# Patient Record
Sex: FEMALE | Race: BLACK OR AFRICAN AMERICAN | Employment: UNEMPLOYED | URBAN - METROPOLITAN AREA
[De-identification: names, ages, dates, MRNs, and addresses within clinical notes are randomized per-mention and may not be internally consistent; named-entity substitution may affect disease eponyms.]

---
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
## 2022-10-07 ENCOUNTER — APPOINTMENT (OUTPATIENT)
Dept: GENERAL RADIOLOGY | Age: 22
DRG: 313 | End: 2022-10-07
Attending: SURGERY
Payer: COMMERCIAL

## 2022-10-07 ENCOUNTER — APPOINTMENT (OUTPATIENT)
Dept: GENERAL RADIOLOGY | Age: 22
DRG: 313 | End: 2022-10-07
Attending: EMERGENCY MEDICINE
Payer: COMMERCIAL

## 2022-10-07 ENCOUNTER — APPOINTMENT (OUTPATIENT)
Dept: GENERAL RADIOLOGY | Age: 22
DRG: 313 | End: 2022-10-07
Attending: ORTHOPAEDIC SURGERY
Payer: COMMERCIAL

## 2022-10-07 ENCOUNTER — ANESTHESIA (OUTPATIENT)
Dept: SURGERY | Age: 22
DRG: 313 | End: 2022-10-07
Payer: COMMERCIAL

## 2022-10-07 ENCOUNTER — APPOINTMENT (OUTPATIENT)
Dept: CT IMAGING | Age: 22
DRG: 313 | End: 2022-10-07
Attending: EMERGENCY MEDICINE
Payer: COMMERCIAL

## 2022-10-07 ENCOUNTER — HOSPITAL ENCOUNTER (INPATIENT)
Age: 22
LOS: 6 days | Discharge: HOME HEALTH | DRG: 313 | End: 2022-10-13
Attending: EMERGENCY MEDICINE | Admitting: SURGERY
Payer: COMMERCIAL

## 2022-10-07 ENCOUNTER — ANESTHESIA EVENT (OUTPATIENT)
Dept: SURGERY | Age: 22
DRG: 313 | End: 2022-10-07
Payer: COMMERCIAL

## 2022-10-07 ENCOUNTER — APPOINTMENT (OUTPATIENT)
Dept: CT IMAGING | Age: 22
DRG: 313 | End: 2022-10-07
Attending: ORTHOPAEDIC SURGERY
Payer: COMMERCIAL

## 2022-10-07 DIAGNOSIS — R41.82 ALTERED MENTAL STATUS, UNSPECIFIED ALTERED MENTAL STATUS TYPE: ICD-10-CM

## 2022-10-07 DIAGNOSIS — S82.201C TYPE III OPEN FRACTURE OF RIGHT TIBIA AND FIBULA, INITIAL ENCOUNTER: Primary | ICD-10-CM

## 2022-10-07 DIAGNOSIS — S82.401C TYPE III OPEN FRACTURE OF RIGHT TIBIA AND FIBULA, INITIAL ENCOUNTER: Primary | ICD-10-CM

## 2022-10-07 PROBLEM — S82.891B OPEN RIGHT ANKLE FRACTURE: Status: ACTIVE | Noted: 2022-10-07

## 2022-10-07 LAB
ALBUMIN SERPL-MCNC: 3.6 G/DL (ref 3.5–5)
ALBUMIN/GLOB SERPL: 1 {RATIO} (ref 1.1–2.2)
ALP SERPL-CCNC: 89 U/L (ref 45–117)
ALT SERPL-CCNC: 32 U/L (ref 12–78)
AMPHET UR QL SCN: NEGATIVE
ANION GAP SERPL CALC-SCNC: 12 MMOL/L (ref 5–15)
ANION GAP SERPL CALC-SCNC: 6 MMOL/L (ref 5–15)
APTT PPP: 24.5 SEC (ref 21.2–34.1)
ARTERIAL PATENCY WRIST A: YES
ARTERIAL PATENCY WRIST A: YES
AST SERPL W P-5'-P-CCNC: 21 U/L (ref 15–37)
BARBITURATES UR QL SCN: NEGATIVE
BASE DEFICIT BLDA-SCNC: 3.8 MMOL/L
BASE DEFICIT BLDA-SCNC: 6 MMOL/L
BASOPHILS # BLD: 0 K/UL (ref 0–0.1)
BASOPHILS NFR BLD: 0 % (ref 0–1)
BDY SITE: ABNORMAL
BDY SITE: ABNORMAL
BENZODIAZ UR QL: NEGATIVE
BILIRUB SERPL-MCNC: 0.2 MG/DL (ref 0.2–1)
BODY TEMPERATURE: 98
BODY TEMPERATURE: 98.2
BUN SERPL-MCNC: 11 MG/DL (ref 6–20)
BUN SERPL-MCNC: 19 MG/DL (ref 6–20)
BUN/CREAT SERPL: 16 (ref 12–20)
BUN/CREAT SERPL: 17 (ref 12–20)
CA-I BLD-MCNC: 7.5 MG/DL (ref 8.5–10.1)
CA-I BLD-MCNC: 8.3 MG/DL (ref 8.5–10.1)
CANNABINOIDS UR QL SCN: POSITIVE
CHLORIDE SERPL-SCNC: 106 MMOL/L (ref 97–108)
CHLORIDE SERPL-SCNC: 110 MMOL/L (ref 97–108)
CO2 SERPL-SCNC: 20 MMOL/L (ref 21–32)
CO2 SERPL-SCNC: 23 MMOL/L (ref 21–32)
COCAINE UR QL SCN: NEGATIVE
COHGB MFR BLD: 0.3 % (ref 1–2)
COHGB MFR BLD: 0.3 % (ref 1–2)
CREAT SERPL-MCNC: 0.64 MG/DL (ref 0.55–1.02)
CREAT SERPL-MCNC: 1.21 MG/DL (ref 0.55–1.02)
DIFFERENTIAL METHOD BLD: ABNORMAL
DRUG SCRN COMMENT,DRGCM: ABNORMAL
EOSINOPHIL # BLD: 0 K/UL (ref 0–0.4)
EOSINOPHIL NFR BLD: 0 % (ref 0–7)
ERYTHROCYTE [DISTWIDTH] IN BLOOD BY AUTOMATED COUNT: 16.8 % (ref 11.5–14.5)
ERYTHROCYTE [DISTWIDTH] IN BLOOD BY AUTOMATED COUNT: 17.2 % (ref 11.5–14.5)
ETHANOL SERPL-MCNC: 146 MG/DL
FIO2 ON VENT: 30 %
FIO2 ON VENT: 50 %
GAS FLOW.O2 SETTING OXYMISER: 16 L/MIN
GAS FLOW.O2 SETTING OXYMISER: 16 L/MIN
GLOBULIN SER CALC-MCNC: 3.6 G/DL (ref 2–4)
GLUCOSE SERPL-MCNC: 163 MG/DL (ref 65–100)
GLUCOSE SERPL-MCNC: 89 MG/DL (ref 65–100)
HCG SERPL QL: NEGATIVE
HCO3 BLDA-SCNC: 20 MMOL/L (ref 22–26)
HCO3 BLDA-SCNC: 20 MMOL/L (ref 22–26)
HCT VFR BLD AUTO: 29.4 % (ref 35–47)
HCT VFR BLD AUTO: 36.9 % (ref 35–47)
HGB BLD-MCNC: 11.3 G/DL (ref 11.5–16)
HGB BLD-MCNC: 9.2 G/DL (ref 11.5–16)
IMM GRANULOCYTES # BLD AUTO: 0 K/UL (ref 0–0.04)
IMM GRANULOCYTES NFR BLD AUTO: 0 % (ref 0–0.5)
INR PPP: 1.1 (ref 0.9–1.1)
LACTATE SERPL-SCNC: 0.8 MMOL/L (ref 0.4–2)
LACTATE SERPL-SCNC: 3.3 MMOL/L (ref 0.4–2)
LACTATE SERPL-SCNC: 7.4 MMOL/L (ref 0.4–2)
LYMPHOCYTES # BLD: 1.5 K/UL (ref 0.8–3.5)
LYMPHOCYTES NFR BLD: 23 % (ref 12–49)
MCH RBC QN AUTO: 23.5 PG (ref 26–34)
MCH RBC QN AUTO: 23.8 PG (ref 26–34)
MCHC RBC AUTO-ENTMCNC: 30.6 G/DL (ref 30–36.5)
MCHC RBC AUTO-ENTMCNC: 31.3 G/DL (ref 30–36.5)
MCV RBC AUTO: 76 FL (ref 80–99)
MCV RBC AUTO: 76.7 FL (ref 80–99)
METHADONE UR QL: NEGATIVE
METHGB MFR BLD: 0.4 % (ref 0–1.4)
METHGB MFR BLD: 0.4 % (ref 0–1.4)
MONOCYTES # BLD: 0.5 K/UL (ref 0–1)
MONOCYTES NFR BLD: 7 % (ref 5–13)
MRSA DNA SPEC QL NAA+PROBE: NOT DETECTED
NEUTS SEG # BLD: 4.5 K/UL (ref 1.8–8)
NEUTS SEG NFR BLD: 70 % (ref 32–75)
NRBC # BLD: 0 K/UL (ref 0–0.01)
NRBC # BLD: 0 K/UL (ref 0–0.01)
NRBC BLD-RTO: 0 PER 100 WBC
NRBC BLD-RTO: 0 PER 100 WBC
OPIATES UR QL: NEGATIVE
OXYHGB MFR BLD: 97.7 % (ref 95–99)
OXYHGB MFR BLD: 98.5 % (ref 95–99)
PCO2 BLDA: 31 MMHG (ref 35–45)
PCO2 BLDA: 38 MMHG (ref 35–45)
PCP UR QL: NEGATIVE
PEEP RESPIRATORY: 5 CM[H2O]
PEEP RESPIRATORY: 5 CM[H2O]
PERFORMED BY, TECHID: ABNORMAL
PERFORMED BY, TECHID: ABNORMAL
PH BLDA: 7.33 [PH] (ref 7.35–7.45)
PH BLDA: 7.42 [PH] (ref 7.35–7.45)
PLATELET # BLD AUTO: 230 K/UL (ref 150–400)
PLATELET # BLD AUTO: 335 K/UL (ref 150–400)
PMV BLD AUTO: 10 FL (ref 8.9–12.9)
PMV BLD AUTO: 10.4 FL (ref 8.9–12.9)
PO2 BLDA: 163 MMHG (ref 80–100)
PO2 BLDA: 268 MMHG (ref 80–100)
POTASSIUM SERPL-SCNC: 2.9 MMOL/L (ref 3.5–5.1)
POTASSIUM SERPL-SCNC: 4.2 MMOL/L (ref 3.5–5.1)
PROT SERPL-MCNC: 7.2 G/DL (ref 6.4–8.2)
PROTHROMBIN TIME: 14.4 SEC (ref 11.9–14.6)
RBC # BLD AUTO: 3.87 M/UL (ref 3.8–5.2)
RBC # BLD AUTO: 4.81 M/UL (ref 3.8–5.2)
SAO2 % BLD: 98 % (ref 95–99)
SAO2 % BLD: 99 % (ref 95–99)
SAO2% DEVICE SAO2% SENSOR NAME: ABNORMAL
SAO2% DEVICE SAO2% SENSOR NAME: ABNORMAL
SODIUM SERPL-SCNC: 138 MMOL/L (ref 136–145)
SODIUM SERPL-SCNC: 139 MMOL/L (ref 136–145)
SPECIMEN SITE: ABNORMAL
SPECIMEN SITE: ABNORMAL
THERAPEUTIC RANGE,PTTT: NORMAL SEC (ref 82–109)
VENTILATION MODE VENT: ABNORMAL
VENTILATION MODE VENT: ABNORMAL
VT SETTING VENT: 460 ML
VT SETTING VENT: 460 ML
WBC # BLD AUTO: 6.6 K/UL (ref 3.6–11)
WBC # BLD AUTO: 8.5 K/UL (ref 3.6–11)

## 2022-10-07 PROCEDURE — 77030007000: Performed by: ORTHOPAEDIC SURGERY

## 2022-10-07 PROCEDURE — 93005 ELECTROCARDIOGRAM TRACING: CPT

## 2022-10-07 PROCEDURE — 80053 COMPREHEN METABOLIC PANEL: CPT

## 2022-10-07 PROCEDURE — 76000 FLUOROSCOPY <1 HR PHYS/QHP: CPT

## 2022-10-07 PROCEDURE — 83605 ASSAY OF LACTIC ACID: CPT

## 2022-10-07 PROCEDURE — 0QSG04Z REPOSITION RIGHT TIBIA WITH INTERNAL FIXATION DEVICE, OPEN APPROACH: ICD-10-PCS | Performed by: ORTHOPAEDIC SURGERY

## 2022-10-07 PROCEDURE — 74011250637 HC RX REV CODE- 250/637: Performed by: PSYCHIATRY & NEUROLOGY

## 2022-10-07 PROCEDURE — 71045 X-RAY EXAM CHEST 1 VIEW: CPT

## 2022-10-07 PROCEDURE — 74011250636 HC RX REV CODE- 250/636: Performed by: INTERNAL MEDICINE

## 2022-10-07 PROCEDURE — 74011000250 HC RX REV CODE- 250: Performed by: ORTHOPAEDIC SURGERY

## 2022-10-07 PROCEDURE — 2709999900 HC NON-CHARGEABLE SUPPLY: Performed by: ORTHOPAEDIC SURGERY

## 2022-10-07 PROCEDURE — 74011250636 HC RX REV CODE- 250/636: Performed by: SURGERY

## 2022-10-07 PROCEDURE — 74011000258 HC RX REV CODE- 258: Performed by: NURSE ANESTHETIST, CERTIFIED REGISTERED

## 2022-10-07 PROCEDURE — 96374 THER/PROPH/DIAG INJ IV PUSH: CPT

## 2022-10-07 PROCEDURE — 73600 X-RAY EXAM OF ANKLE: CPT

## 2022-10-07 PROCEDURE — 90471 IMMUNIZATION ADMIN: CPT

## 2022-10-07 PROCEDURE — 80307 DRUG TEST PRSMV CHEM ANLYZR: CPT

## 2022-10-07 PROCEDURE — 74011000250 HC RX REV CODE- 250: Performed by: NURSE ANESTHETIST, CERTIFIED REGISTERED

## 2022-10-07 PROCEDURE — 94002 VENT MGMT INPAT INIT DAY: CPT

## 2022-10-07 PROCEDURE — 85730 THROMBOPLASTIN TIME PARTIAL: CPT

## 2022-10-07 PROCEDURE — 65610000006 HC RM INTENSIVE CARE

## 2022-10-07 PROCEDURE — 36600 WITHDRAWAL OF ARTERIAL BLOOD: CPT

## 2022-10-07 PROCEDURE — 77030031139 HC SUT VCRL2 J&J -A: Performed by: ORTHOPAEDIC SURGERY

## 2022-10-07 PROCEDURE — 90714 TD VACC NO PRESV 7 YRS+ IM: CPT | Performed by: EMERGENCY MEDICINE

## 2022-10-07 PROCEDURE — 0QHG05Z INSERTION OF EXTERNAL FIXATION DEVICE INTO RIGHT TIBIA, OPEN APPROACH: ICD-10-PCS | Performed by: ORTHOPAEDIC SURGERY

## 2022-10-07 PROCEDURE — 70450 CT HEAD/BRAIN W/O DYE: CPT

## 2022-10-07 PROCEDURE — 74011250636 HC RX REV CODE- 250/636: Performed by: ANESTHESIOLOGY

## 2022-10-07 PROCEDURE — 77030016920 HC CLMP EXT FIX4 SYNT -F: Performed by: ORTHOPAEDIC SURGERY

## 2022-10-07 PROCEDURE — 77030040766 HC POST EXT FIX SYNTH -C: Performed by: ORTHOPAEDIC SURGERY

## 2022-10-07 PROCEDURE — 74011250636 HC RX REV CODE- 250/636

## 2022-10-07 PROCEDURE — 87086 URINE CULTURE/COLONY COUNT: CPT

## 2022-10-07 PROCEDURE — 72125 CT NECK SPINE W/O DYE: CPT

## 2022-10-07 PROCEDURE — 74011000258 HC RX REV CODE- 258: Performed by: INTERNAL MEDICINE

## 2022-10-07 PROCEDURE — 74011250636 HC RX REV CODE- 250/636: Performed by: EMERGENCY MEDICINE

## 2022-10-07 PROCEDURE — 87641 MR-STAPH DNA AMP PROBE: CPT

## 2022-10-07 PROCEDURE — 77030019952 HC CANSTR VAC ASST KCON -B: Performed by: ORTHOPAEDIC SURGERY

## 2022-10-07 PROCEDURE — 77030031140 HC SUT VCRL3 J&J -A: Performed by: ORTHOPAEDIC SURGERY

## 2022-10-07 PROCEDURE — 85027 COMPLETE CBC AUTOMATED: CPT

## 2022-10-07 PROCEDURE — 5A1935Z RESPIRATORY VENTILATION, LESS THAN 24 CONSECUTIVE HOURS: ICD-10-PCS | Performed by: RADIOLOGY

## 2022-10-07 PROCEDURE — 74011000250 HC RX REV CODE- 250: Performed by: EMERGENCY MEDICINE

## 2022-10-07 PROCEDURE — 77030019934 HC DRSG VAC ASST KCON -B: Performed by: ORTHOPAEDIC SURGERY

## 2022-10-07 PROCEDURE — 82077 ASSAY SPEC XCP UR&BREATH IA: CPT

## 2022-10-07 PROCEDURE — C1713 ANCHOR/SCREW BN/BN,TIS/BN: HCPCS | Performed by: ORTHOPAEDIC SURGERY

## 2022-10-07 PROCEDURE — 74011250636 HC RX REV CODE- 250/636: Performed by: NURSE ANESTHETIST, CERTIFIED REGISTERED

## 2022-10-07 PROCEDURE — 0BH17EZ INSERTION OF ENDOTRACHEAL AIRWAY INTO TRACHEA, VIA NATURAL OR ARTIFICIAL OPENING: ICD-10-PCS | Performed by: RADIOLOGY

## 2022-10-07 PROCEDURE — 77010033678 HC OXYGEN DAILY

## 2022-10-07 PROCEDURE — 85610 PROTHROMBIN TIME: CPT

## 2022-10-07 PROCEDURE — 74011000258 HC RX REV CODE- 258: Performed by: EMERGENCY MEDICINE

## 2022-10-07 PROCEDURE — 77030002933 HC SUT MCRYL J&J -A: Performed by: ORTHOPAEDIC SURGERY

## 2022-10-07 PROCEDURE — 86900 BLOOD TYPING SEROLOGIC ABO: CPT

## 2022-10-07 PROCEDURE — 76060000035 HC ANESTHESIA 2 TO 2.5 HR: Performed by: ORTHOPAEDIC SURGERY

## 2022-10-07 PROCEDURE — 84703 CHORIONIC GONADOTROPIN ASSAY: CPT

## 2022-10-07 PROCEDURE — 77030016919 HC CLMP EXT FIX1 SYNT -F: Performed by: ORTHOPAEDIC SURGERY

## 2022-10-07 PROCEDURE — 99285 EMERGENCY DEPT VISIT HI MDM: CPT

## 2022-10-07 PROCEDURE — 85025 COMPLETE CBC W/AUTO DIFF WBC: CPT

## 2022-10-07 PROCEDURE — 82803 BLOOD GASES ANY COMBINATION: CPT

## 2022-10-07 PROCEDURE — 86923 COMPATIBILITY TEST ELECTRIC: CPT

## 2022-10-07 PROCEDURE — 96366 THER/PROPH/DIAG IV INF ADDON: CPT

## 2022-10-07 PROCEDURE — 80048 BASIC METABOLIC PNL TOTAL CA: CPT

## 2022-10-07 PROCEDURE — 73700 CT LOWER EXTREMITY W/O DYE: CPT

## 2022-10-07 PROCEDURE — 96365 THER/PROPH/DIAG IV INF INIT: CPT

## 2022-10-07 PROCEDURE — 76010000131 HC OR TIME 2 TO 2.5 HR: Performed by: ORTHOPAEDIC SURGERY

## 2022-10-07 RX ORDER — SODIUM CHLORIDE 0.9 % (FLUSH) 0.9 %
5-40 SYRINGE (ML) INJECTION EVERY 8 HOURS
Status: DISCONTINUED | OUTPATIENT
Start: 2022-10-07 | End: 2022-10-11

## 2022-10-07 RX ORDER — SODIUM CHLORIDE 9 MG/ML
INJECTION, SOLUTION INTRAVENOUS
Status: DISCONTINUED | OUTPATIENT
Start: 2022-10-07 | End: 2022-10-07 | Stop reason: HOSPADM

## 2022-10-07 RX ORDER — OLANZAPINE 10 MG/1
10 INJECTION, POWDER, LYOPHILIZED, FOR SOLUTION INTRAMUSCULAR
Status: DISCONTINUED | OUTPATIENT
Start: 2022-10-07 | End: 2022-10-13 | Stop reason: HOSPADM

## 2022-10-07 RX ORDER — POTASSIUM CHLORIDE 7.45 MG/ML
INJECTION INTRAVENOUS
Status: COMPLETED
Start: 2022-10-07 | End: 2022-10-07

## 2022-10-07 RX ORDER — MORPHINE SULFATE 4 MG/ML
4 INJECTION INTRAVENOUS
Status: DISCONTINUED | OUTPATIENT
Start: 2022-10-07 | End: 2022-10-10

## 2022-10-07 RX ORDER — DOCUSATE SODIUM 100 MG/1
100 CAPSULE, LIQUID FILLED ORAL
Status: DISCONTINUED | OUTPATIENT
Start: 2022-10-07 | End: 2022-10-13 | Stop reason: HOSPADM

## 2022-10-07 RX ORDER — SODIUM CHLORIDE 0.9 % (FLUSH) 0.9 %
5-40 SYRINGE (ML) INJECTION AS NEEDED
Status: CANCELLED | OUTPATIENT
Start: 2022-10-07

## 2022-10-07 RX ORDER — ONDANSETRON 2 MG/ML
4 INJECTION INTRAMUSCULAR; INTRAVENOUS AS NEEDED
Status: CANCELLED | OUTPATIENT
Start: 2022-10-07

## 2022-10-07 RX ORDER — PROPOFOL 10 MG/ML
INJECTION, EMULSION INTRAVENOUS
Status: DISCONTINUED | OUTPATIENT
Start: 2022-10-07 | End: 2022-10-07 | Stop reason: HOSPADM

## 2022-10-07 RX ORDER — ENOXAPARIN SODIUM 100 MG/ML
30 INJECTION SUBCUTANEOUS EVERY 24 HOURS
Status: DISCONTINUED | OUTPATIENT
Start: 2022-10-07 | End: 2022-10-13 | Stop reason: HOSPADM

## 2022-10-07 RX ORDER — FENTANYL CITRATE 50 UG/ML
50 INJECTION, SOLUTION INTRAMUSCULAR; INTRAVENOUS AS NEEDED
Status: CANCELLED | OUTPATIENT
Start: 2022-10-07

## 2022-10-07 RX ORDER — OXYCODONE HYDROCHLORIDE 5 MG/1
5 TABLET ORAL
Status: DISCONTINUED | OUTPATIENT
Start: 2022-10-07 | End: 2022-10-10

## 2022-10-07 RX ORDER — SODIUM CHLORIDE, SODIUM LACTATE, POTASSIUM CHLORIDE, CALCIUM CHLORIDE 600; 310; 30; 20 MG/100ML; MG/100ML; MG/100ML; MG/100ML
125 INJECTION, SOLUTION INTRAVENOUS CONTINUOUS
Status: DISCONTINUED | OUTPATIENT
Start: 2022-10-07 | End: 2022-10-08

## 2022-10-07 RX ORDER — MIDAZOLAM IN 0.9 % SOD.CHLORID 1 MG/ML
0-10 PLASTIC BAG, INJECTION (ML) INTRAVENOUS
Status: DISCONTINUED | OUTPATIENT
Start: 2022-10-07 | End: 2022-10-07

## 2022-10-07 RX ORDER — SODIUM CHLORIDE 0.9 % (FLUSH) 0.9 %
5-40 SYRINGE (ML) INJECTION AS NEEDED
Status: DISCONTINUED | OUTPATIENT
Start: 2022-10-07 | End: 2022-10-13 | Stop reason: HOSPADM

## 2022-10-07 RX ORDER — CEFAZOLIN SODIUM 1 G/3ML
INJECTION, POWDER, FOR SOLUTION INTRAMUSCULAR; INTRAVENOUS AS NEEDED
Status: DISCONTINUED | OUTPATIENT
Start: 2022-10-07 | End: 2022-10-07 | Stop reason: HOSPADM

## 2022-10-07 RX ORDER — LIDOCAINE HYDROCHLORIDE 10 MG/ML
0.1 INJECTION, SOLUTION EPIDURAL; INFILTRATION; INTRACAUDAL; PERINEURAL AS NEEDED
Status: CANCELLED | OUTPATIENT
Start: 2022-10-07

## 2022-10-07 RX ORDER — LORAZEPAM 2 MG/ML
1 INJECTION INTRAMUSCULAR
Status: DISCONTINUED | OUTPATIENT
Start: 2022-10-07 | End: 2022-10-13 | Stop reason: HOSPADM

## 2022-10-07 RX ORDER — OXYCODONE AND ACETAMINOPHEN 5; 325 MG/1; MG/1
1 TABLET ORAL AS NEEDED
Status: CANCELLED | OUTPATIENT
Start: 2022-10-07

## 2022-10-07 RX ORDER — MIDAZOLAM HYDROCHLORIDE 1 MG/ML
1 INJECTION, SOLUTION INTRAMUSCULAR; INTRAVENOUS AS NEEDED
Status: CANCELLED | OUTPATIENT
Start: 2022-10-07

## 2022-10-07 RX ORDER — ROCURONIUM BROMIDE 10 MG/ML
100 INJECTION, SOLUTION INTRAVENOUS
Status: COMPLETED | OUTPATIENT
Start: 2022-10-07 | End: 2022-10-07

## 2022-10-07 RX ORDER — POTASSIUM CHLORIDE 7.45 MG/ML
10 INJECTION INTRAVENOUS
Status: DISPENSED | OUTPATIENT
Start: 2022-10-07 | End: 2022-10-07

## 2022-10-07 RX ORDER — PROPOFOL 10 MG/ML
0-50 VIAL (ML) INTRAVENOUS
Status: DISCONTINUED | OUTPATIENT
Start: 2022-10-07 | End: 2022-10-07

## 2022-10-07 RX ORDER — KETOROLAC TROMETHAMINE 30 MG/ML
15 INJECTION, SOLUTION INTRAMUSCULAR; INTRAVENOUS
Status: DISCONTINUED | OUTPATIENT
Start: 2022-10-07 | End: 2022-10-07 | Stop reason: SDUPTHER

## 2022-10-07 RX ORDER — MORPHINE SULFATE 10 MG/ML
2 INJECTION, SOLUTION INTRAMUSCULAR; INTRAVENOUS
Status: CANCELLED | OUTPATIENT
Start: 2022-10-07

## 2022-10-07 RX ORDER — SODIUM CHLORIDE 0.9 % (FLUSH) 0.9 %
5-40 SYRINGE (ML) INJECTION EVERY 8 HOURS
Status: CANCELLED | OUTPATIENT
Start: 2022-10-07

## 2022-10-07 RX ORDER — DIPHENHYDRAMINE HYDROCHLORIDE 50 MG/ML
12.5 INJECTION, SOLUTION INTRAMUSCULAR; INTRAVENOUS AS NEEDED
Status: CANCELLED | OUTPATIENT
Start: 2022-10-07 | End: 2022-10-07

## 2022-10-07 RX ORDER — FENTANYL CITRATE 50 UG/ML
50 INJECTION, SOLUTION INTRAMUSCULAR; INTRAVENOUS
Status: CANCELLED | OUTPATIENT
Start: 2022-10-07

## 2022-10-07 RX ORDER — ROCURONIUM BROMIDE 10 MG/ML
50 INJECTION, SOLUTION INTRAVENOUS
Status: COMPLETED | OUTPATIENT
Start: 2022-10-07 | End: 2022-10-07

## 2022-10-07 RX ORDER — ETOMIDATE 2 MG/ML
20 INJECTION INTRAVENOUS ONCE
Status: COMPLETED | OUTPATIENT
Start: 2022-10-07 | End: 2022-10-07

## 2022-10-07 RX ORDER — EPHEDRINE SULFATE/0.9% NACL/PF 50 MG/5 ML
5 SYRINGE (ML) INTRAVENOUS AS NEEDED
Status: CANCELLED | OUTPATIENT
Start: 2022-10-07

## 2022-10-07 RX ORDER — BISMUTH SUBSALICYLATE 262 MG
1 TABLET,CHEWABLE ORAL DAILY
COMMUNITY

## 2022-10-07 RX ORDER — KETOROLAC TROMETHAMINE 15 MG/ML
15 INJECTION, SOLUTION INTRAMUSCULAR; INTRAVENOUS EVERY 6 HOURS
Status: COMPLETED | OUTPATIENT
Start: 2022-10-07 | End: 2022-10-12

## 2022-10-07 RX ORDER — POLYETHYLENE GLYCOL 3350 17 G/17G
17 POWDER, FOR SOLUTION ORAL DAILY
Status: DISCONTINUED | OUTPATIENT
Start: 2022-10-08 | End: 2022-10-13 | Stop reason: HOSPADM

## 2022-10-07 RX ORDER — ROCURONIUM BROMIDE 10 MG/ML
INJECTION, SOLUTION INTRAVENOUS AS NEEDED
Status: DISCONTINUED | OUTPATIENT
Start: 2022-10-07 | End: 2022-10-07 | Stop reason: HOSPADM

## 2022-10-07 RX ORDER — SENNOSIDES 8.6 MG/1
1 TABLET ORAL DAILY
Status: DISCONTINUED | OUTPATIENT
Start: 2022-10-08 | End: 2022-10-13 | Stop reason: HOSPADM

## 2022-10-07 RX ORDER — OLANZAPINE 10 MG/1
5 TABLET ORAL EVERY 12 HOURS
Status: DISCONTINUED | OUTPATIENT
Start: 2022-10-07 | End: 2022-10-13 | Stop reason: HOSPADM

## 2022-10-07 RX ORDER — METOPROLOL TARTRATE 5 MG/5ML
2.5 INJECTION INTRAVENOUS
Status: CANCELLED | OUTPATIENT
Start: 2022-10-07

## 2022-10-07 RX ORDER — HYDRALAZINE HYDROCHLORIDE 20 MG/ML
10 INJECTION INTRAMUSCULAR; INTRAVENOUS
Status: CANCELLED | OUTPATIENT
Start: 2022-10-07

## 2022-10-07 RX ORDER — TRAMADOL HYDROCHLORIDE 50 MG/1
50 TABLET ORAL
Status: DISCONTINUED | OUTPATIENT
Start: 2022-10-07 | End: 2022-10-07

## 2022-10-07 RX ORDER — MORPHINE SULFATE 4 MG/ML
4 INJECTION INTRAVENOUS
Status: DISCONTINUED | OUTPATIENT
Start: 2022-10-07 | End: 2022-10-07

## 2022-10-07 RX ORDER — HYDROMORPHONE HYDROCHLORIDE 1 MG/ML
0.5 INJECTION, SOLUTION INTRAMUSCULAR; INTRAVENOUS; SUBCUTANEOUS
Status: CANCELLED | OUTPATIENT
Start: 2022-10-07

## 2022-10-07 RX ORDER — LABETALOL HCL 20 MG/4 ML
5 SYRINGE (ML) INTRAVENOUS
Status: CANCELLED | OUTPATIENT
Start: 2022-10-07

## 2022-10-07 RX ORDER — MIDAZOLAM HYDROCHLORIDE 1 MG/ML
0.5 INJECTION, SOLUTION INTRAMUSCULAR; INTRAVENOUS
Status: CANCELLED | OUTPATIENT
Start: 2022-10-07

## 2022-10-07 RX ORDER — OXYCODONE HYDROCHLORIDE 5 MG/1
5 TABLET ORAL
Status: DISCONTINUED | OUTPATIENT
Start: 2022-10-07 | End: 2022-10-07

## 2022-10-07 RX ORDER — SODIUM CHLORIDE, SODIUM LACTATE, POTASSIUM CHLORIDE, CALCIUM CHLORIDE 600; 310; 30; 20 MG/100ML; MG/100ML; MG/100ML; MG/100ML
INJECTION, SOLUTION INTRAVENOUS
Status: DISCONTINUED | OUTPATIENT
Start: 2022-10-07 | End: 2022-10-07 | Stop reason: HOSPADM

## 2022-10-07 RX ORDER — FENTANYL CITRATE 50 UG/ML
INJECTION, SOLUTION INTRAMUSCULAR; INTRAVENOUS AS NEEDED
Status: DISCONTINUED | OUTPATIENT
Start: 2022-10-07 | End: 2022-10-07 | Stop reason: HOSPADM

## 2022-10-07 RX ORDER — HYDROMORPHONE HYDROCHLORIDE 1 MG/ML
2 INJECTION, SOLUTION INTRAMUSCULAR; INTRAVENOUS; SUBCUTANEOUS
Status: DISCONTINUED | OUTPATIENT
Start: 2022-10-07 | End: 2022-10-07

## 2022-10-07 RX ADMIN — SODIUM CHLORIDE: 9 INJECTION, SOLUTION INTRAVENOUS at 06:20

## 2022-10-07 RX ADMIN — GENTAMICIN SULFATE 340 MG: 40 INJECTION, SOLUTION INTRAMUSCULAR; INTRAVENOUS at 03:55

## 2022-10-07 RX ADMIN — METHYLPREDNISOLONE SODIUM SUCCINATE 40 MG: 40 INJECTION, POWDER, FOR SOLUTION INTRAMUSCULAR; INTRAVENOUS at 22:47

## 2022-10-07 RX ADMIN — TETANUS AND DIPHTHERIA TOXOIDS ADSORBED 0.5 ML: 2; 2 INJECTION INTRAMUSCULAR at 03:31

## 2022-10-07 RX ADMIN — ROCURONIUM BROMIDE 100 MG: 10 SOLUTION INTRAVENOUS at 02:04

## 2022-10-07 RX ADMIN — Medication 50 MCG/HR: at 09:18

## 2022-10-07 RX ADMIN — ROCURONIUM BROMIDE 50 MG: 10 INJECTION, SOLUTION INTRAVENOUS at 05:38

## 2022-10-07 RX ADMIN — Medication 200 MCG/HR: at 12:25

## 2022-10-07 RX ADMIN — Medication 2 MG/HR: at 12:30

## 2022-10-07 RX ADMIN — PIPERACILLIN AND TAZOBACTAM 3.38 G: 3; .375 INJECTION, POWDER, FOR SOLUTION INTRAVENOUS at 22:47

## 2022-10-07 RX ADMIN — POTASSIUM CHLORIDE 10 MEQ: 7.46 INJECTION, SOLUTION INTRAVENOUS at 12:01

## 2022-10-07 RX ADMIN — SODIUM CHLORIDE, POTASSIUM CHLORIDE, SODIUM LACTATE AND CALCIUM CHLORIDE: 600; 310; 30; 20 INJECTION, SOLUTION INTRAVENOUS at 07:35

## 2022-10-07 RX ADMIN — Medication 4 MG/HR: at 12:55

## 2022-10-07 RX ADMIN — FENTANYL CITRATE 100 MCG: 50 INJECTION, SOLUTION INTRAMUSCULAR; INTRAVENOUS at 06:36

## 2022-10-07 RX ADMIN — ETOMIDATE 20 MG: 2 INJECTION INTRAVENOUS at 02:03

## 2022-10-07 RX ADMIN — SODIUM CHLORIDE, PRESERVATIVE FREE 10 ML: 5 INJECTION INTRAVENOUS at 19:19

## 2022-10-07 RX ADMIN — PHENYLEPHRINE HYDROCHLORIDE 10 MCG/MIN: 10 INJECTION INTRAVENOUS at 07:18

## 2022-10-07 RX ADMIN — KETOROLAC TROMETHAMINE 15 MG: 15 INJECTION, SOLUTION INTRAMUSCULAR; INTRAVENOUS at 14:44

## 2022-10-07 RX ADMIN — ROCURONIUM BROMIDE 50 MG: 10 INJECTION, SOLUTION INTRAVENOUS at 06:22

## 2022-10-07 RX ADMIN — CEFAZOLIN 2 G: 1 INJECTION, POWDER, FOR SOLUTION INTRAMUSCULAR; INTRAVENOUS at 03:30

## 2022-10-07 RX ADMIN — PROPOFOL 40 MCG/KG/MIN: 10 INJECTION, EMULSION INTRAVENOUS at 07:52

## 2022-10-07 RX ADMIN — SODIUM CHLORIDE, POTASSIUM CHLORIDE, SODIUM LACTATE AND CALCIUM CHLORIDE 125 ML/HR: 600; 310; 30; 20 INJECTION, SOLUTION INTRAVENOUS at 19:19

## 2022-10-07 RX ADMIN — PHENYLEPHRINE HYDROCHLORIDE 100 MCG: 10 INJECTION INTRAVENOUS at 07:03

## 2022-10-07 RX ADMIN — PHENYLEPHRINE HYDROCHLORIDE 100 MCG: 10 INJECTION INTRAVENOUS at 06:57

## 2022-10-07 RX ADMIN — Medication 3 MG/HR: at 12:49

## 2022-10-07 RX ADMIN — PHENYLEPHRINE HYDROCHLORIDE 100 MCG: 10 INJECTION INTRAVENOUS at 06:33

## 2022-10-07 RX ADMIN — METHYLPREDNISOLONE SODIUM SUCCINATE 40 MG: 40 INJECTION, POWDER, FOR SOLUTION INTRAMUSCULAR; INTRAVENOUS at 18:17

## 2022-10-07 RX ADMIN — POTASSIUM CHLORIDE 10 MEQ: 7.46 INJECTION, SOLUTION INTRAVENOUS at 10:35

## 2022-10-07 RX ADMIN — PHENYLEPHRINE HYDROCHLORIDE 100 MCG: 10 INJECTION INTRAVENOUS at 06:27

## 2022-10-07 RX ADMIN — PHENYLEPHRINE HYDROCHLORIDE 100 MCG: 10 INJECTION INTRAVENOUS at 07:11

## 2022-10-07 RX ADMIN — MORPHINE SULFATE 4 MG: 4 INJECTION INTRAVENOUS at 15:22

## 2022-10-07 RX ADMIN — KETOROLAC TROMETHAMINE 15 MG: 15 INJECTION, SOLUTION INTRAMUSCULAR; INTRAVENOUS at 23:00

## 2022-10-07 RX ADMIN — FENTANYL CITRATE 100 MCG: 50 INJECTION, SOLUTION INTRAMUSCULAR; INTRAVENOUS at 07:52

## 2022-10-07 RX ADMIN — CEFAZOLIN SODIUM 2 G: 1 INJECTION, POWDER, FOR SOLUTION INTRAMUSCULAR; INTRAVENOUS at 07:07

## 2022-10-07 RX ADMIN — PROPOFOL 20 MCG/KG/MIN: 10 INJECTION, EMULSION INTRAVENOUS at 02:15

## 2022-10-07 RX ADMIN — OLANZAPINE 5 MG: 10 TABLET, FILM COATED ORAL at 15:29

## 2022-10-07 RX ADMIN — KETOROLAC TROMETHAMINE 15 MG: 15 INJECTION, SOLUTION INTRAMUSCULAR; INTRAVENOUS at 18:17

## 2022-10-07 RX ADMIN — SODIUM CHLORIDE, POTASSIUM CHLORIDE, SODIUM LACTATE AND CALCIUM CHLORIDE 125 ML/HR: 600; 310; 30; 20 INJECTION, SOLUTION INTRAVENOUS at 09:25

## 2022-10-07 RX ADMIN — ROCURONIUM BROMIDE 20 MG: 10 INJECTION, SOLUTION INTRAVENOUS at 07:22

## 2022-10-07 RX ADMIN — SODIUM CHLORIDE, PRESERVATIVE FREE 10 ML: 5 INJECTION INTRAVENOUS at 22:48

## 2022-10-07 RX ADMIN — POTASSIUM CHLORIDE 10 MEQ: 7.46 INJECTION, SOLUTION INTRAVENOUS at 09:25

## 2022-10-07 RX ADMIN — PIPERACILLIN AND TAZOBACTAM 3.38 G: 3; .375 INJECTION, POWDER, FOR SOLUTION INTRAVENOUS at 14:55

## 2022-10-07 NOTE — PROGRESS NOTES
Spoke with Dr. Rosa Buck with [address] on-call with orthopedics and orders received for CT, pain, and bowel regimen. Discussed wound closure of open wound & wound vac, discussed with pharmacy and plans to hold Lovenox at this time. LLE SCD remains in place.

## 2022-10-07 NOTE — PROGRESS NOTES
Sputum culture with gram stain still pending. No secretions suctioned from ETT to collect and send to lab. Will continue to assess and send when available suitable specimen.

## 2022-10-07 NOTE — PROGRESS NOTES
Dr. Papo Muhammad paged via [facility]. Stated he just signed off on orders and ready for release. Contact pulmonary for sedation and Icu orders.
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
## 2022-10-07 NOTE — PROGRESS NOTES
1:1 sitter order received from Dr. Malik Ask for client expressing homicidal ideations and asking father \"for a gun. \" Charge nurse, Maikol, notified. 1:1 Mena oneill, at bedside for safety.
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
## 2022-10-07 NOTE — H&P
[facility] SURGERY H&P           Chief Complaint: right foot pain    History of Present Illness:    Mr/MsJanneth Mcneil is a 24y.o. year old * female presents to ER after a fall and sustained right ankle fracture. She also was presented with altered mental status and had to be intubated. Underwent right ankle ORIF with wound vac placement. Admitted to Trauma service for further management. Past Medical History: History reviewed. No pertinent past medical history. Past Surgical History: History reviewed. No pertinent surgical history. Allergy:No Known Allergies    Social History:  reports current alcohol use. She reports current drug use. Drug: Marijuana. Family History:History reviewed. No pertinent family history.      Current Medications:  Current Facility-Administered Medications:     propofol (DIPRIVAN) 10 mg/mL infusion, 0-50 mcg/kg/min, IntraVENous, TITRATE, Nisha Gunderson MD, Last Rate: 9.5 mL/hr at 10/07/22 0215, 20 mcg/kg/min at 10/07/22 0215    fentaNYL (PF) 1,500 mcg/30 mL (50 mcg/mL) infusion, 0-200 mcg/hr, IntraVENous, TITRATE, Nisha Gunderson MD, Last Rate: 0.5 mL/hr at 10/07/22 1305, 25 mcg/hr at 10/07/22 1305    sodium chloride 0.9 % bolus infusion 250 mL, 250 mL, IntraVENous, ONCE, Nisha Gunderson MD    lactated Ringers infusion, 125 mL/hr, IntraVENous, CONTINUOUS, Keli Serra MD, Last Rate: 125 mL/hr at 10/07/22 0925, 125 mL/hr at 10/07/22 0925    midazolam in normal saline (VERSED) 1 mg/mL infusion, 0-10 mg/hr, IntraVENous, TITRATE, Michoacano Giraldo MD, Stopped at 10/07/22 1309    piperacillin-tazobactam (ZOSYN) 3.375 g in 0.9% sodium chloride (MBP/ADV) 100 mL MBP, 3.375 g, IntraVENous, Q8H, Michoacano Giraldo MD    HYDROmorphone (DILAUDID) injection 2 mg, 2 mg, IntraVENous, Q4H PRN, Keli Serra MD    ketorolac (TORADOL) injection 15 mg, 15 mg, IntraVENous, Q6H, Linh Serra MD     Immunization History:   Most Recent Immunizations   Administered Date(s) Administered    Td, Adsorbed 10/07/2022      Complete    Review of Systems:     Constitutional:  no fever,  no chills,  no sweats, No weakness, No fatigue, No decreased activity. Eye: No recent visual problem, No icterus, No discharge, No double vision. Ear/Nose/Mouth/Throat: No decreased hearing, No ear pain, No nasal congestion, No sore throat. Respiratory: No shortness of breath, No cough, No sputum production, No hemoptysis, No wheezing, No cyanosis. Cardiovascular: No chest pain, No palpitations, No bradycardia, No tachycardia, No peripheral edema, No syncope. Gastrointestinal: No nausea,  No vomiting, No diarrhea, No constipation, No heartburn,  No abdominal pain. Genitourinary: No dysuria, No hematuria, No change in urine stream, No urethral discharge, No lesions. Hematology/Lymphatics: No bruising tendency, No bleeding tendency, No petechiae, No swollen lymph glands. Endocrine: No excessive thirst, No polyuria, No cold intolerance, No heat intolerance, No excessive hunger. Immunologic: Not immunocompromised, No recurrent fevers, No recurrent infections. Musculoskeletal: No back pain, No neck pain, No joint pain, No muscle pain, No claudication, No decreased range of motion, No trauma. Integumentary: No rash, No pruritus, No abrasions. Neurologic: Alert and oriented X4, No abnormal balance, No headache, No confusion, No numbness, No tingling. Psychiatric: No anxiety, No depression, No dickson. Physical Exam:     Vitals & Measurements:     Wt Readings from Last 3 Encounters:   10/07/22 77.1 kg (170 lb)     Temp Readings from Last 3 Encounters:   10/07/22 98.7 °F (37.1 °C)     BP Readings from Last 3 Encounters:   10/07/22 (!) 127/59     Pulse Readings from Last 3 Encounters:   10/07/22 79      Ht Readings from Last 3 Encounters:   10/07/22 5' 7\" (1.702 m)          General: well appearing, no acute distress  Head: Normal  Face: Nornal  HEENT: atraumatic, PERRLA, moist mucosa, normal pharynx, normal tonsils and adenoids, normal tongue, no fluid in sinuses  Neck: Trachea midline, no carotid bruit, no masses  Chest: Normal.  Respiratory: Normal chest wall expansion, CTA B, no r/r/w, no rubs  Cardiovascular: RRR, no m/r/g, Normal S1 and S2  Abdomen: Soft, non tender, non-distended, normal bowel sounds in all quadrants, no hepatosplenomegaly, no tympany. Incision scar:   Genitourinary: No inguinal hernia, normal external gentalia, no renal angle tenderness, george+  Rectal: deferred  Musculoskeletal: normal ROM in upper and lower extremities, No joint swelling.  Right ankle external fixator with wound vac+  Integumentary: Warm, dry, and pink, with no rash, purpura, or petechia  Heme/Lymph: No lymphadenopathy, no bruises  Neurological:Cranial Nerves II-XII grossly intact, no gross motor or sensory deficit  Psychiatric: Cooperative with normal mood, affect, and cognition      Laboratory Values:   Recent Results (from the past 24 hour(s))   CBC W/O DIFF    Collection Time: 10/07/22  2:17 AM   Result Value Ref Range    WBC 8.5 3.6 - 11.0 K/uL    RBC 4.81 3.80 - 5.20 M/uL    HGB 11.3 (L) 11.5 - 16.0 g/dL    HCT 36.9 35.0 - 47.0 %    MCV 76.7 (L) 80.0 - 99.0 FL    MCH 23.5 (L) 26.0 - 34.0 PG    MCHC 30.6 30.0 - 36.5 g/dL    RDW 16.8 (H) 11.5 - 14.5 %    PLATELET 588 068 - 706 K/uL    MPV 10.0 8.9 - 12.9 FL    NRBC 0.0 0.0  WBC    ABSOLUTE NRBC 0.00 0.00 - 9.54 K/uL   METABOLIC PANEL, COMPREHENSIVE    Collection Time: 10/07/22  2:17 AM   Result Value Ref Range    Sodium 138 136 - 145 mmol/L    Potassium 2.9 (L) 3.5 - 5.1 mmol/L    Chloride 106 97 - 108 mmol/L    CO2 20 (L) 21 - 32 mmol/L    Anion gap 12 5 - 15 mmol/L    Glucose 163 (H) 65 - 100 mg/dL    BUN 19 6 - 20 mg/dL    Creatinine 1.21 (H) 0.55 - 1.02 mg/dL    BUN/Creatinine ratio 16 12 - 20      eGFR >60 >60 ml/min/1.73m2    Calcium 8.3 (L) 8.5 - 10.1 mg/dL    Bilirubin, total 0.2 0.2 - 1.0 mg/dL    AST (SGOT) 21 15 - 37 U/L    ALT (SGPT) 32 12 - 78 U/L    Alk. phosphatase 89 45 - 117 U/L    Protein, total 7.2 6.4 - 8.2 g/dL    Albumin 3.6 3.5 - 5.0 g/dL    Globulin 3.6 2.0 - 4.0 g/dL    A-G Ratio 1.0 (L) 1.1 - 2.2     ETHYL ALCOHOL    Collection Time: 10/07/22  2:17 AM   Result Value Ref Range    ALCOHOL(ETHYL),SERUM 146 (H) <10 mg/dL   LACTIC ACID    Collection Time: 10/07/22  2:17 AM   Result Value Ref Range    Lactic acid 7.4 (HH) 0.4 - 2.0 mmol/L   TYPE & SCREEN    Collection Time: 10/07/22  2:17 AM   Result Value Ref Range    Crossmatch Expiration 10/10/2022,2359     ABO/Rh(D) B Positive     Antibody screen Negative    PTT    Collection Time: 10/07/22  2:17 AM   Result Value Ref Range    aPTT 24.5 21.2 - 34.1 sec    aPTT, therapeutic range   82 - 109 sec   PROTHROMBIN TIME + INR    Collection Time: 10/07/22  2:17 AM   Result Value Ref Range    Prothrombin time 14.4 11.9 - 14.6 sec    INR 1.1 0.9 - 1.1     HCG QL SERUM    Collection Time: 10/07/22  2:17 AM   Result Value Ref Range    HCG, Ql. Negative Negative     DRUG SCREEN, URINE    Collection Time: 10/07/22  2:38 AM   Result Value Ref Range    AMPHETAMINES Negative Negative      BARBITURATES Negative Negative      BENZODIAZEPINES Negative Negative      COCAINE Negative Negative      METHADONE Negative Negative      OPIATES Negative Negative      PCP(PHENCYCLIDINE) Negative Negative      THC (TH-CANNABINOL) Positive (A) Negative      Drug screen comment        This test is a screen for drugs of abuse in a medical setting only (i.e., they are unconfirmed results and as such must not be used for non-medical purposes, e.g.,employment testing, legal testing). Due to its inherent nature, false positive (FP) and false negative (FN) results may be obtained. Therefore, if necessary for medical care, recommend confirmation of positive findings by GC/MS.      BLOOD GAS, ARTERIAL    Collection Time: 10/07/22  4:21 AM   Result Value Ref Range    pH 7.33 (L) 7.35 - 7.45      PCO2 38 35 - 45 mmHg    PO2 163 (H) 80 - 100 mmHg    O2 SATURATION 98 95 - 99 %    BICARBONATE 20 (L) 22 - 26 mmol/L    BASE DEFICIT 6.0 mmol/L    O2 METHOD VENT      FIO2 30 %    MODE ASSIST CONTROL      Tidal volume 460.0      SET RATE 16      PEEP/CPAP 5.0      Sample source Arterial      SITE Left Radial      BASHIR'S TEST YES      Carboxy-Hgb 0.3 (L) 1 - 2 %    Methemoglobin 0.4 0 - 1.4 %    Oxyhemoglobin 97.7 95 - 99 %    Performed by Jaycob Barbosa     TEMPERATURE 98.0     MRSA SCREEN - PCR (NASAL)    Collection Time: 10/07/22  9:20 AM   Result Value Ref Range    MRSA by PCR, Nasal Not Detected Not Detected     BLOOD GAS, ARTERIAL    Collection Time: 10/07/22 10:45 AM   Result Value Ref Range    pH 7.42 7.35 - 7.45      PCO2 31 (L) 35 - 45 mmHg    PO2 268 (H) 80 - 100 mmHg    O2 SATURATION 99 95 - 99 %    BICARBONATE 20 (L) 22 - 26 mmol/L    BASE DEFICIT 3.8 mmol/L    O2 METHOD VENT      FIO2 50 %    MODE ASSIST CONTROL      Tidal volume 460.0      SET RATE 16      PEEP/CPAP 5.0      Sample source Arterial      SITE Left Radial      BASHIR'S TEST YES      Carboxy-Hgb 0.3 (L) 1 - 2 %    Methemoglobin 0.4 0 - 1.4 %    Oxyhemoglobin 98.5 95 - 99 %    Performed by Geraldine Becker     TEMPERATURE 98.2     LACTIC ACID    Collection Time: 10/07/22 11:15 AM   Result Value Ref Range    Lactic acid 3.3 (HH) 0.4 - 2.0 mmol/L   METABOLIC PANEL, BASIC    Collection Time: 10/07/22 11:15 AM   Result Value Ref Range    Sodium 139 136 - 145 mmol/L    Potassium 4.2 3.5 - 5.1 mmol/L    Chloride 110 (H) 97 - 108 mmol/L    CO2 23 21 - 32 mmol/L    Anion gap 6 5 - 15 mmol/L    Glucose 89 65 - 100 mg/dL    BUN 11 6 - 20 mg/dL    Creatinine 0.64 0.55 - 1.02 mg/dL    BUN/Creatinine ratio 17 12 - 20      eGFR >60 >60 ml/min/1.73m2    Calcium 7.5 (L) 8.5 - 10.1 mg/dL   CBC WITH AUTOMATED DIFF    Collection Time: 10/07/22 11:15 AM   Result Value Ref Range    WBC 6.6 3.6 - 11.0 K/uL    RBC 3.87 3.80 - 5.20 M/uL    HGB 9.2 (L) 11.5 - 16.0 g/dL    HCT 29.4 (L) 35.0 - 47.0 %    MCV 76.0 (L) 80.0 - 99.0 FL    MCH 23.8 (L) 26.0 - 34.0 PG    MCHC 31.3 30.0 - 36.5 g/dL    RDW 17.2 (H) 11.5 - 14.5 %    PLATELET 474 253 - 517 K/uL    MPV 10.4 8.9 - 12.9 FL    NRBC 0.0 0.0  WBC    ABSOLUTE NRBC 0.00 0.00 - 0.01 K/uL    NEUTROPHILS 70 32 - 75 %    LYMPHOCYTES 23 12 - 49 %    MONOCYTES 7 5 - 13 %    EOSINOPHILS 0 0 - 7 %    BASOPHILS 0 0 - 1 %    IMMATURE GRANULOCYTES 0 0 - 0.5 %    ABS. NEUTROPHILS 4.5 1.8 - 8.0 K/UL    ABS. LYMPHOCYTES 1.5 0.8 - 3.5 K/UL    ABS. MONOCYTES 0.5 0.0 - 1.0 K/UL    ABS. EOSINOPHILS 0.0 0.0 - 0.4 K/UL    ABS. BASOPHILS 0.0 0.0 - 0.1 K/UL    ABS. IMM. GRANS. 0.0 0.00 - 0.04 K/UL    DF AUTOMATED           XR CHEST PORT   Final Result   1. Lungs are hypoventilatory but grossly clear. XR FLUOROSCOPY UNDER 60 MINUTES   Final Result   Portable fluoroscopy. INTERPRETATION PROVIDED FOR COMPLIANCE ONLY AT NO CHARGE                         XR CHEST PORT   Final Result      Satisfactory endotracheal tube placement. Stable low lung volumes with bibasilar   atelectasis. CT HEAD WO CONT   Final Result      No acute intracranial abnormality. CT SPINE CERV WO CONT   Final Result   No acute abnormality. XR CHEST PORT   Final Result      Satisfactory endotracheal tube placement. Low lung volumes with bibasilar   atelectasis. XR ANKLE RT AP/LAT   Final Result   Acute displaced fracture articular fractures of the distal tibia and   fibula. Soft tissue gas. XR CHEST PORT    (Results Pending)           Assessment:  S/P fall  Right ankle fracture         Plan:    Admission  Diet: start clears   IV fluids  SCD  IS  Pain medications  Antibiotics  Nausea medication  Sarabia  Labs in am  Consult: Ortho/Pulmonary & Psche       Thank you for the consultation & allowing me to participate in the care of this patient.

## 2022-10-07 NOTE — PROGRESS NOTES
Patient extubated after 100% oxygen and suction without complications and placed on oxygen at 2 lpm nc.

## 2022-10-07 NOTE — BRIEF OP NOTE
Brief Postoperative Note    Patient: Traci Fink  YOB: 2000  MRN: [id number]    Date of Procedure: 10/7/2022     Pre-Op Diagnosis: OPEN FRACTURE RIGHT ANKLE    Post-Op Diagnosis: Same as preoperative diagnosis.       Procedure(s):  IRRIGATION AND DEBRIDEMENT APPLICATION OF EXTERNAL FIXATOR LOWER EXTREMITY    Surgeon(s):  Sameer Puente MD    Surgical Assistant: Surg Asst-1: Kim Villela    Anesthesia: General     Estimated Blood Loss (mL): Minimal    Complications: None    Specimens:   ID Type Source Tests Collected by Time Destination   1 : Urine Culture Urine Sarabia Specimen CULTURE, URINE Sameer Puente MD 10/7/2022 0630 Microbiology        Implants: * No implants in log *    Drains: * No LDAs found *    Findings: see full op report for details    Electronically Signed by Anmol Palma MD on 10/7/2022 at 8:46 AM

## 2022-10-07 NOTE — PROGRESS NOTES
Rounded with Katharine Peterson, critical care pharmacist, discussed client polysubstance abuse, behaviors, family concerns for behaviors, and increasing mood changes since giving birth. Recommendations given to utilize Propofol and Fentanyl for sedation and not starting Versed for sedation management at this time and start as last resort.

## 2022-10-07 NOTE — ANESTHESIA PREPROCEDURE EVALUATION
Relevant Problems   No relevant active problems       Anesthetic History   No history of anesthetic complications            Review of Systems / Medical History  Patient summary reviewed, nursing notes reviewed and pertinent labs reviewed    Pulmonary          Smoker      Comments: Smokes marijuana daily. Neuro/Psych         Headaches and psychiatric history     Cardiovascular  Within defined limits                Exercise tolerance: >4 METS: SVT on admission to the ED. Hypokalemia. GI/Hepatic/Renal         Renal disease: ARF       Endo/Other        Anemia     Other Findings   Comments: Hypokalemia    EtOH 146       History reviewed. No pertinent past medical history. History reviewed. No pertinent surgical history.     No current outpatient medications    Current Facility-Administered Medications   Medication Dose Route Frequency    propofol (DIPRIVAN) 10 mg/mL infusion  0-50 mcg/kg/min IntraVENous TITRATE    fentaNYL (PF) 1,500 mcg/30 mL (50 mcg/mL) infusion  0-200 mcg/hr IntraVENous TITRATE    potassium chloride 10 mEq in 100 ml IVPB  10 mEq IntraVENous Q1H    sodium chloride 0.9 % bolus infusion 250 mL  250 mL IntraVENous ONCE     Facility-Administered Medications Ordered in Other Encounters   Medication Dose Route Frequency    rocuronium injection   IntraVENous PRN    PHENYLephrine 100 mcg/mL 10 mL syringe (ONE-STEP)   IntraVENous CONTINUOUS    0.9% sodium chloride infusion   IntraVENous CONTINUOUS       Patient Vitals for the past 24 hrs:   Pulse Resp BP SpO2   10/07/22 0214 -- -- -- 100 %   10/07/22 0211 (!) 144 21 (!) 127/59 100 %   10/07/22 0201 -- -- (!) 127/59 --   10/07/22 0158 (!) 165 25 -- 100 %       Lab Results   Component Value Date/Time    WBC 8.5 10/07/2022 02:17 AM    HGB 11.3 (L) 10/07/2022 02:17 AM    HCT 36.9 10/07/2022 02:17 AM    PLATELET 863 00/98/5444 02:17 AM    MCV 76.7 (L) 10/07/2022 02:17 AM     Lab Results   Component Value Date/Time    Sodium 138 10/07/2022 02:17 AM Potassium 2.9 (L) 10/07/2022 02:17 AM    Chloride 106 10/07/2022 02:17 AM    CO2 20 (L) 10/07/2022 02:17 AM    Anion gap 12 10/07/2022 02:17 AM    Glucose 163 (H) 10/07/2022 02:17 AM    BUN 19 10/07/2022 02:17 AM    Creatinine 1.21 (H) 10/07/2022 02:17 AM    BUN/Creatinine ratio 16 10/07/2022 02:17 AM    Calcium 8.3 (L) 10/07/2022 02:17 AM     Lab Results   Component Value Date/Time    APTT 24.5 10/07/2022 02:17 AM    PTP 14.4 10/07/2022 02:17 AM    INR 1.1 10/07/2022 02:17 AM     Lab Results   Component Value Date/Time    Glucose 163 (H) 10/07/2022 02:17 AM     Physical Exam    Airway          Intubated  Comments: Unable to assess. Pt intubated. Cardiovascular    Rhythm: regular          Comments: Hypokalemia. Dental      Comments: Unable to assess pt intubated.    Pulmonary  Breath sounds clear to auscultation               Abdominal  Abdominal exam normal       Other Findings            Anesthetic Plan    ASA: 2, emergent  Anesthesia type: general        Post procedure ventilation   Induction: Inhalational  Anesthetic plan and risks discussed with: Family
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
## 2022-10-07 NOTE — PROGRESS NOTES
Client still reporting pain 8/10 after IV Morphine. Dr. Neil Murphy called by critical care pharmacist and message left to clarify pain medication orders and PO coverage for breakthrough pain. Dr. Danielle Alexis, on-call for orthopedics paged via Telepage at [phone] to clarify CT ankle/RLE orders discussed with Dr. Ruth Lundberg this am during post-op period, bowel regimen, and pain regimen.

## 2022-10-07 NOTE — Clinical Note
Status[de-identified] INPATIENT [101]   Type of Bed: Intensive Care [6]   Cardiac Monitoring Required?: Yes   Inpatient Hospitalization Certified Necessary for the Following Reasons: 3.  Patient receiving treatment that can only be provided in an inpatient setting (further clarification in H&P documentation)   Admitting Diagnosis: Open right ankle fracture [8168921]   Admitting Physician: Tonia Ortega [[id number]]   Attending Physician: Tonia Ortega [[id number]]   Estimated Length of Stay: 2 Midnights   Discharge Plan[de-identified] Home with Office Follow-up
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
## 2022-10-07 NOTE — PROGRESS NOTES
Reason for Admission:  Open right ankle fracture                     RUR Score:   10% Low                  Plan for utilizing home health:          PCP: First and Last name:  None     Name of Practice:    Are you a current patient: Yes/No:    Approximate date of last visit:    Can you participate in a virtual visit with your PCP:                     Current Advanced Directive/Advance Care Plan: No Order      Healthcare Decision Maker:   Click here to complete [place] including selection of the Healthcare Decision Maker Relationship (ie \"Primary\")             Primary Decision MakerValamadou Orozco - Mother - [phone]    Primary Decision Maker: Samuel Dailey - Father - [phone]                  Transition of Care Plan:                      No established PCP. [facility] ([facility]) to  medications. Mother reports patient is independent w/all ADL's & IADL's and drives. Care Management Interventions  PCP Verified by CM: No  Mode of Transport at Discharge:  Other (see comment) (Family)  Transition of Care Consult (CM Consult): Discharge Planning  Support Systems: Parent(s)  Confirm Follow Up Transport: Family  [facility]  [facility] [address] [place] [address]

## 2022-10-07 NOTE — ANESTHESIA POSTPROCEDURE EVALUATION
Procedure(s):  IRRIGATION AND DEBRIDEMENT APPLICATION OF EXTERNAL FIXATOR LOWER EXTREMITY. general    Anesthesia Post Evaluation      Multimodal analgesia: multimodal analgesia used between 6 hours prior to anesthesia start to PACU discharge  Patient location during evaluation: ICU  Patient participation: complete - patient cannot participate  Level of consciousness: obtunded/minimal responses  Pain score: 0  Pain management: adequate  Airway patency: patent  Anesthetic complications: no  Cardiovascular status: acceptable  Respiratory status: acceptable and ventilator  Hydration status: acceptable  Post anesthesia nausea and vomiting:  controlled  Final Post Anesthesia Temperature Assessment:  Normothermia (36.0-37.5 degrees C)      INITIAL Post-op Vital signs:   Vitals Value Taken Time   /68    Temp 36.6    Pulse 82 10/07/22 0903   Resp 17 10/07/22 0846   SpO2 100 % 10/07/22 0903   Vitals shown include unvalidated device data.

## 2022-10-07 NOTE — PROGRESS NOTES
Discussed orders received from Dr. Mckayla Hernandez with critical care pharmacist; she is clarifying pain medication orders with Dr. Mckayla Hernandez and will enter. Fentanyl gtt off @ 1405. Client in room resting in bed with mother, father, and 1:1 sitter for safety at bedside.

## 2022-10-07 NOTE — ED TRIAGE NOTES
Call was for patient who jumped off of a column and sustained an open ankle fracture, patient admits to ETOH and THC use tonight but patient is yelling and not compliant with staff on arrival to the ED.  In route patient went into SVT and was given 6 mg of adenosine at 0145 by EMS    MD at bedside in triage confirms pulse present in extremity

## 2022-10-07 NOTE — CONSULTS
Consult Date: 10/7/2022     CONSULT TO ORTHOPEDIC SURGERY  Consult performed by: Iglesia Stovall MD  Consult ordered by: Ilya Barry MD  Reason for consult: open tibia fracture Left  Assessment/Recommendations:     IMPRESSION:  1) displaced open fracture of right distal tibia and fibula, with exposed bone (Gustilo 2-3) -possibly intra-articular  2) acute psychosis/delirium of uncertain etiology  3) mechanism of high-energy trauma  4) family history of psychiatric illness    RECOMMENDATIONS/PLAN:    I had a detailed discussion with the patient's parents and grandmother about the nature of the injury to the right leg, its natural history and treatment options. Staged management of the injury was recommended and offered, the first stage in the form of urgent debridement of the open fracture of the right leg/tibia, with preliminary stabilization with an external fixator. Subsequent wound management and observation, assessment of the possible need for soft tissue coverage/closure techniques, would be followed by definitive stabilization of the fractures, either internal or external fixation. Potential risks and complications of the injury and its treatment were discussed and acknowledged. The patient's parents wished to proceed with the recommended treatment plan. The patient will be taken to surgery expeditiously. Postoperatively, a CT scan will be requested for definitive diagnostic imaging of the fracture pattern, wound care will be initiated on basic principles, and general management of the patient will be under supervision of the trauma team and other specialty services as indicated. Subjective   Rekha Nones, 24 y.o. female brought in by EMS with altered mental status and open ankle fracture. Patient was at a restaurant and jumped off of a pillar outside of the restaurant and did a karate kick in the air landing onto cobblestone ground and broke her ankle.   She has a open right ankle fracture with exposed bone. Patient was combative in route. She became tachycardic to the 170s with presumed SVT and was given 1 dose of adenosine with no change. Patient had admitted to alcohol and marijuana use. The ED physician consulted orthopedic surgery urgently, due to an open fracture of the right distal leg, with external protrusion of the tibial shaft which was irreducible in the emergency room. On my interview and examination of the patient in the ED, the patient's parents and grandmother were by the bedside, along with the nursing team.  History was obtained from the ED team, as well as from the patient's family. They corroborated elements of history as noted above. History reviewed. No pertinent past medical history. History reviewed. No pertinent surgical history. History reviewed. No pertinent family history.    Social History     Tobacco Use    Smoking status: Unknown    Smokeless tobacco: Not on file   Substance Use Topics    Alcohol use: Yes       Current Facility-Administered Medications   Medication Dose Route Frequency Provider Last Rate Last Admin    propofol (DIPRIVAN) 10 mg/mL infusion  0-50 mcg/kg/min IntraVENous TITRATE Monique Redd MD 9.5 mL/hr at 10/07/22 0215 20 mcg/kg/min at 10/07/22 0215    fentaNYL (PF) 1,500 mcg/30 mL (50 mcg/mL) infusion  0-200 mcg/hr IntraVENous TITRATE Monique Redd MD        potassium chloride 10 mEq in 100 ml IVPB  10 mEq IntraVENous Q1H Monique Redd MD        sodium chloride 0.9 % bolus infusion 250 mL  250 mL IntraVENous ONCE Monique Redd MD            Review of Systems  Unable to perform ROS: Mental status change -patient was intubated    Objective     Vital signs for last 24 hours:  Visit Vitals  BP (!) 127/59 (BP 1 Location: Left upper arm, BP Patient Position: At rest)   Pulse (!) 144   Resp 21   Ht 5' 7\" (1.702 m)   Wt 77.1 kg (170 lb)   SpO2 100%   BMI 26.63 kg/m²       Intake/Output this shift:  Current Shift: No intake/output data recorded. Last 3 Shifts: No intake/output data recorded. Data Review:     IMAGING:    X-rays of the right ankle were available for review. Supramalleolar fracture of the right distal tibia was noted, approximately 1 inch proximal to the joint line. Distal metaphyseal fragment was suspicious for possible intra-articular extension. An angulated fracture of the fibular shaft was also noted in the distal fourth. FINDINGS: Two views of the right ankle demonstrate acute displaced  extra-articular fractures of the distal tibia and fibula. The joint spaces are  maintained. Medial soft tissue gas is noted. IMPRESSION  Acute displaced fracture articular fractures of the distal tibia and  fibula. Soft tissue gas. Recent Results (from the past 24 hour(s))   CBC W/O DIFF    Collection Time: 10/07/22  2:17 AM   Result Value Ref Range    WBC 8.5 3.6 - 11.0 K/uL    RBC 4.81 3.80 - 5.20 M/uL    HGB 11.3 (L) 11.5 - 16.0 g/dL    HCT 36.9 35.0 - 47.0 %    MCV 76.7 (L) 80.0 - 99.0 FL    MCH 23.5 (L) 26.0 - 34.0 PG    MCHC 30.6 30.0 - 36.5 g/dL    RDW 16.8 (H) 11.5 - 14.5 %    PLATELET 692 768 - 678 K/uL    MPV 10.0 8.9 - 12.9 FL    NRBC 0.0 0.0  WBC    ABSOLUTE NRBC 0.00 0.00 - 2.47 K/uL   METABOLIC PANEL, COMPREHENSIVE    Collection Time: 10/07/22  2:17 AM   Result Value Ref Range    Sodium 138 136 - 145 mmol/L    Potassium 2.9 (L) 3.5 - 5.1 mmol/L    Chloride 106 97 - 108 mmol/L    CO2 20 (L) 21 - 32 mmol/L    Anion gap 12 5 - 15 mmol/L    Glucose 163 (H) 65 - 100 mg/dL    BUN 19 6 - 20 mg/dL    Creatinine 1.21 (H) 0.55 - 1.02 mg/dL    BUN/Creatinine ratio 16 12 - 20      eGFR >60 >60 ml/min/1.73m2    Calcium 8.3 (L) 8.5 - 10.1 mg/dL    Bilirubin, total 0.2 0.2 - 1.0 mg/dL    AST (SGOT) 21 15 - 37 U/L    ALT (SGPT) 32 12 - 78 U/L    Alk.  phosphatase 89 45 - 117 U/L    Protein, total 7.2 6.4 - 8.2 g/dL    Albumin 3.6 3.5 - 5.0 g/dL    Globulin 3.6 2.0 - 4.0 g/dL    A-G Ratio 1.0 (L) 1.1 - 2.2     ETHYL ALCOHOL    Collection Time: 10/07/22  2:17 AM   Result Value Ref Range    ALCOHOL(ETHYL),SERUM 146 (H) <10 mg/dL   LACTIC ACID    Collection Time: 10/07/22  2:17 AM   Result Value Ref Range    Lactic acid 7.4 (HH) 0.4 - 2.0 mmol/L   TYPE & SCREEN    Collection Time: 10/07/22  2:17 AM   Result Value Ref Range    Crossmatch Expiration 10/10/2022,2359     ABO/Rh(D) B Positive     Antibody screen Negative    PTT    Collection Time: 10/07/22  2:17 AM   Result Value Ref Range    aPTT 24.5 21.2 - 34.1 sec    aPTT, therapeutic range   82 - 109 sec   PROTHROMBIN TIME + INR    Collection Time: 10/07/22  2:17 AM   Result Value Ref Range    Prothrombin time 14.4 11.9 - 14.6 sec    INR 1.1 0.9 - 1.1     HCG QL SERUM    Collection Time: 10/07/22  2:17 AM   Result Value Ref Range    HCG, Ql. Negative Negative     DRUG SCREEN, URINE    Collection Time: 10/07/22  2:38 AM   Result Value Ref Range    AMPHETAMINES Negative Negative      BARBITURATES Negative Negative      BENZODIAZEPINES Negative Negative      COCAINE Negative Negative      METHADONE Negative Negative      OPIATES Negative Negative      PCP(PHENCYCLIDINE) Negative Negative      THC (TH-CANNABINOL) Positive (A) Negative      Drug screen comment        This test is a screen for drugs of abuse in a medical setting only (i.e., they are unconfirmed results and as such must not be used for non-medical purposes, e.g.,employment testing, legal testing). Due to its inherent nature, false positive (FP) and false negative (FN) results may be obtained. Therefore, if necessary for medical care, recommend confirmation of positive findings by GC/MS.      BLOOD GAS, ARTERIAL    Collection Time: 10/07/22  4:21 AM   Result Value Ref Range    pH 7.33 (L) 7.35 - 7.45      PCO2 38 35 - 45 mmHg    PO2 163 (H) 80 - 100 mmHg    O2 SATURATION 98 95 - 99 %    BICARBONATE 20 (L) 22 - 26 mmol/L    BASE DEFICIT 6.0 mmol/L    O2 METHOD VENT      FIO2 30 %    MODE ASSIST CONTROL      Tidal volume 460.0      SET RATE 16      PEEP/CPAP 5.0      Sample source Arterial      SITE Left Radial      BASHIR'S TEST YES      Carboxy-Hgb 0.3 (L) 1 - 2 %    Methemoglobin 0.4 0 - 1.4 %    Oxyhemoglobin 97.7 95 - 99 %    Performed by Gricelda Peer     TEMPERATURE 98.0         Physical Exam  Vitals and nursing note reviewed. HENT:      Head: Normocephalic and atraumatic. Mouth/Throat:      Mouth: Endotracheal tube in place  Eyes:      Conjunctiva/sclera: Conjunctivae normal.   Pulmonary:      Effort: Patient is intubated, on a ventilator  Musculoskeletal:      Cervical back: Limited exam due to being intubated and ventilated     Comments: Right ankle with open fracture and 4 cm of distal tibia protruding from wounds. Clinical photographs were reviewed with the emergency physician. The patient had a soft tissue splint in place on my examination. DPs 2+   Skin:     General: Skin is warm and dry. Neurological:      General: No focal deficit present. Mental Status: She is alert. Mental status is at baseline.

## 2022-10-08 ENCOUNTER — APPOINTMENT (OUTPATIENT)
Dept: GENERAL RADIOLOGY | Age: 22
DRG: 313 | End: 2022-10-08
Attending: INTERNAL MEDICINE
Payer: COMMERCIAL

## 2022-10-08 LAB
ALBUMIN SERPL-MCNC: 3.1 G/DL (ref 3.5–5)
ALBUMIN/GLOB SERPL: 1.1 {RATIO} (ref 1.1–2.2)
ALP SERPL-CCNC: 80 U/L (ref 45–117)
ALT SERPL-CCNC: 26 U/L (ref 12–78)
ANION GAP SERPL CALC-SCNC: 6 MMOL/L (ref 5–15)
AST SERPL W P-5'-P-CCNC: 31 U/L (ref 15–37)
BACTERIA SPEC CULT: NORMAL
BASOPHILS # BLD: 0 K/UL (ref 0–0.1)
BASOPHILS NFR BLD: 0 % (ref 0–1)
BILIRUB SERPL-MCNC: 0.7 MG/DL (ref 0.2–1)
BUN SERPL-MCNC: 8 MG/DL (ref 6–20)
BUN/CREAT SERPL: 17 (ref 12–20)
CA-I BLD-MCNC: 8 MG/DL (ref 8.5–10.1)
CHLORIDE SERPL-SCNC: 109 MMOL/L (ref 97–108)
CO2 SERPL-SCNC: 24 MMOL/L (ref 21–32)
CREAT SERPL-MCNC: 0.47 MG/DL (ref 0.55–1.02)
DIFFERENTIAL METHOD BLD: ABNORMAL
EOSINOPHIL # BLD: 0 K/UL (ref 0–0.4)
EOSINOPHIL NFR BLD: 0 % (ref 0–7)
ERYTHROCYTE [DISTWIDTH] IN BLOOD BY AUTOMATED COUNT: 17.4 % (ref 11.5–14.5)
GLOBULIN SER CALC-MCNC: 2.8 G/DL (ref 2–4)
GLUCOSE SERPL-MCNC: 115 MG/DL (ref 65–100)
HCT VFR BLD AUTO: 28.1 % (ref 35–47)
HGB BLD-MCNC: 8.9 G/DL (ref 11.5–16)
IMM GRANULOCYTES # BLD AUTO: 0 K/UL (ref 0–0.04)
IMM GRANULOCYTES NFR BLD AUTO: 0 % (ref 0–0.5)
LYMPHOCYTES # BLD: 0.8 K/UL (ref 0.8–3.5)
LYMPHOCYTES NFR BLD: 13 % (ref 12–49)
MCH RBC QN AUTO: 23.9 PG (ref 26–34)
MCHC RBC AUTO-ENTMCNC: 31.7 G/DL (ref 30–36.5)
MCV RBC AUTO: 75.3 FL (ref 80–99)
MONOCYTES # BLD: 0.2 K/UL (ref 0–1)
MONOCYTES NFR BLD: 3 % (ref 5–13)
NEUTS SEG # BLD: 5 K/UL (ref 1.8–8)
NEUTS SEG NFR BLD: 84 % (ref 32–75)
NRBC # BLD: 0 K/UL (ref 0–0.01)
NRBC BLD-RTO: 0 PER 100 WBC
PLATELET # BLD AUTO: 196 K/UL (ref 150–400)
PMV BLD AUTO: 10 FL (ref 8.9–12.9)
POTASSIUM SERPL-SCNC: 4.2 MMOL/L (ref 3.5–5.1)
PROT SERPL-MCNC: 5.9 G/DL (ref 6.4–8.2)
RBC # BLD AUTO: 3.73 M/UL (ref 3.8–5.2)
SODIUM SERPL-SCNC: 139 MMOL/L (ref 136–145)
SPECIAL REQUESTS,SREQ: NORMAL
WBC # BLD AUTO: 6 K/UL (ref 3.6–11)

## 2022-10-08 PROCEDURE — 97161 PT EVAL LOW COMPLEX 20 MIN: CPT

## 2022-10-08 PROCEDURE — 36415 COLL VENOUS BLD VENIPUNCTURE: CPT

## 2022-10-08 PROCEDURE — 74011250636 HC RX REV CODE- 250/636: Performed by: INTERNAL MEDICINE

## 2022-10-08 PROCEDURE — 97530 THERAPEUTIC ACTIVITIES: CPT

## 2022-10-08 PROCEDURE — 80053 COMPREHEN METABOLIC PANEL: CPT

## 2022-10-08 PROCEDURE — 65610000006 HC RM INTENSIVE CARE

## 2022-10-08 PROCEDURE — 71045 X-RAY EXAM CHEST 1 VIEW: CPT

## 2022-10-08 PROCEDURE — 85025 COMPLETE CBC W/AUTO DIFF WBC: CPT

## 2022-10-08 PROCEDURE — 74011000258 HC RX REV CODE- 258: Performed by: INTERNAL MEDICINE

## 2022-10-08 PROCEDURE — 74011250636 HC RX REV CODE- 250/636: Performed by: SURGERY

## 2022-10-08 PROCEDURE — 74011250637 HC RX REV CODE- 250/637: Performed by: ORTHOPAEDIC SURGERY

## 2022-10-08 PROCEDURE — 74011250637 HC RX REV CODE- 250/637: Performed by: PSYCHIATRY & NEUROLOGY

## 2022-10-08 PROCEDURE — 74011000250 HC RX REV CODE- 250: Performed by: ORTHOPAEDIC SURGERY

## 2022-10-08 RX ADMIN — METHYLPREDNISOLONE SODIUM SUCCINATE 40 MG: 40 INJECTION, POWDER, FOR SOLUTION INTRAMUSCULAR; INTRAVENOUS at 05:15

## 2022-10-08 RX ADMIN — SODIUM CHLORIDE, POTASSIUM CHLORIDE, SODIUM LACTATE AND CALCIUM CHLORIDE 125 ML/HR: 600; 310; 30; 20 INJECTION, SOLUTION INTRAVENOUS at 04:00

## 2022-10-08 RX ADMIN — OLANZAPINE 5 MG: 10 TABLET, FILM COATED ORAL at 09:08

## 2022-10-08 RX ADMIN — KETOROLAC TROMETHAMINE 15 MG: 15 INJECTION, SOLUTION INTRAMUSCULAR; INTRAVENOUS at 23:09

## 2022-10-08 RX ADMIN — PIPERACILLIN AND TAZOBACTAM 3.38 G: 3; .375 INJECTION, POWDER, FOR SOLUTION INTRAVENOUS at 04:00

## 2022-10-08 RX ADMIN — KETOROLAC TROMETHAMINE 15 MG: 15 INJECTION, SOLUTION INTRAMUSCULAR; INTRAVENOUS at 12:10

## 2022-10-08 RX ADMIN — KETOROLAC TROMETHAMINE 15 MG: 15 INJECTION, SOLUTION INTRAMUSCULAR; INTRAVENOUS at 17:14

## 2022-10-08 RX ADMIN — OXYCODONE 5 MG: 5 TABLET ORAL at 09:16

## 2022-10-08 RX ADMIN — PIPERACILLIN AND TAZOBACTAM 3.38 G: 3; .375 INJECTION, POWDER, FOR SOLUTION INTRAVENOUS at 21:00

## 2022-10-08 RX ADMIN — SODIUM CHLORIDE, PRESERVATIVE FREE 10 ML: 5 INJECTION INTRAVENOUS at 05:16

## 2022-10-08 RX ADMIN — SODIUM CHLORIDE, PRESERVATIVE FREE 10 ML: 5 INJECTION INTRAVENOUS at 17:14

## 2022-10-08 RX ADMIN — OLANZAPINE 5 MG: 10 TABLET, FILM COATED ORAL at 21:01

## 2022-10-08 RX ADMIN — SENNOSIDES 8.6 MG: 8.6 TABLET, COATED ORAL at 09:08

## 2022-10-08 RX ADMIN — OXYCODONE 5 MG: 5 TABLET ORAL at 23:10

## 2022-10-08 RX ADMIN — SODIUM CHLORIDE, PRESERVATIVE FREE 10 ML: 5 INJECTION INTRAVENOUS at 21:01

## 2022-10-08 RX ADMIN — POLYETHYLENE GLYCOL 3350 17 G: 17 POWDER, FOR SOLUTION ORAL at 09:11

## 2022-10-08 RX ADMIN — PIPERACILLIN AND TAZOBACTAM 3.38 G: 3; .375 INJECTION, POWDER, FOR SOLUTION INTRAVENOUS at 13:17

## 2022-10-08 RX ADMIN — KETOROLAC TROMETHAMINE 15 MG: 15 INJECTION, SOLUTION INTRAMUSCULAR; INTRAVENOUS at 05:15

## 2022-10-08 NOTE — CONSULTS
[address]    Name:  Bertram Damian  MR#:  [id number]  :  2000  ACCOUNT #:  [de-identified]  DATE OF SERVICE:  10/07/2022    PSYCHIATRIC CONSULTATION    REASON FOR CONSULTATION:  Depression and psychosis, ordered by the attending. I saw the patient, chart reviewed. I spoke with the patient's mother, patient's father. HISTORY OF PRESENT ILLNESS:  This is a 79-year-old single female patient admitted to ICU from [facility] ED, stating a call for the patient who jumped off a column and sustained an open ankle fracture. The patient at that time admitted to alcohol and THC use, and the patient is yelling and not compliant with the staff on arrival to the ED, en route. The patient went to  and was given 6 mg of adenosine at 01:45 by EMS. The information indicates the patient went with some friends for drinks. After the drinks, she got scared, said something like, \"Where's my gun?\"  Apparently, she was scared and running and jumped off and broke a bone, sticking out. The patient's family says she was exhibiting some paranoia. She just delivered a  a month ago. There is some concern about either postpartum psychosis and depression or having a bad batch of alcohol such as contraband alcohol. The patient's mother was there. She indicated one time her brother went for a vacation, drank some contaminated , very paranoid, delusional, acting like this. There is a family history of schizophrenia. A relative had schizophrenia. Mom felt that she is increasingly paranoid, guarded, isolated, not sharing. She has a 1year-old girl. There was no history of problem during her first pregnancy. She is not . Her significant other/boyfriend was not supportive with her pregnancy. There is no history of prior psychiatric treatment. The patient was agitated, put on a vent. By the time I saw, she is off vent.   Depressed, tearful, confused, perplexed, was expressing, concern about overall paranoia, mistrust, who to trust, where not to trust.  Insight was poor. When I saw, the patient was teary yet denied any depression, suicidal thoughts. However, her attitude, demeanor was of one of paranoia, not trusting people. Also some concern whether she has any substance such as spice. The patient is currently not getting any psychiatric help. No prior psychiatric help. There is a history of substance abuse. 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 She is not in school. She did acknowledge using alcohol but not mentioning whether any drugs. Family and friends very supportive except her significant other. MENTAL STATUS:  Average height, medium-built female patient, of course, with all the things going on, anxiety, disheveled, tearful, . At times, she is paranoid, guarded, . In the ED, she was yelling. Right now, calm and polite with mom, and later, father joined. No agitation, no aggression, no tremulousness. No auditory or visual hallucinations. Memory is intact. Insight is poor. Judgment is poor. Not suicidal, not homicidal.    DIAGNOSES:  Acute psychosis, seems to be resolving; alcohol abuse; THC abuse; history of paranoia; broken ankle and fall. DISPOSITION:  From psych point of view, she is calm, but I would offer the Zyprexa 5 mg twice a day, Zyprexa 10 mg every eight hours p.r.nJanneth Al I will further follow. However, I am not on-call this weekend. If there is a need, please call on-call psychiatrist.  I did recommend the patient sees a psychiatrist while in the inpatient unit, outpatient with the therapist for  issues. Address any postpartum depression, psychosis. Followup is necessary to prevent. Mom and dad are supportive.         Jaida Roa MD      [id number]  D:  10/07/2022 21:48  T:  10/08/2022 2:36  JOB #:  [id number]

## 2022-10-08 NOTE — PROGRESS NOTES
1915  Bedside and Verbal shift change report given to Kira (oncoming nurse) by Reese Ma (offgoing nurse). Report included the following information SBAR, Kardex, OR Summary, Procedure Summary, Intake/Output, MAR, Recent Results, and Cardiac Rhythm NSR .     0700  Bedside and Verbal shift change report given to Son (oncoming nurse) by Samm Nava (offgoing nurse). Report included the following information SBAR, Kardex, OR Summary, Procedure Summary, Intake/Output, MAR, Recent Results, Cardiac Rhythm NSR, and Alarm Parameters .

## 2022-10-08 NOTE — PROGRESS NOTES
PHYSICAL THERAPY EVALUATION  Patient: Memo Colbert (14 y.o. female)  Date: 10/8/2022  Primary Diagnosis: Open right ankle fracture [S82.891B]  Procedure(s) (LRB):  IRRIGATION AND DEBRIDEMENT APPLICATION OF EXTERNAL FIXATOR LOWER EXTREMITY (Right) 1 Day Post-Op   Precautions: NWB RLE      ASSESSMENT  Pt is a 24 y.o. female admitted on 10/7/2022 for fall with c/o right ankle pain with open fracture noted. Right ankle xray showed acute displaced fracture of distal tibia and fibula. Pt underwent I&D and placement of external fixator on 10/7/22. Per medical records pt is NWB on RLE, wound vac currently in place. Pt was intubated at time of arrival, extubated on 10/7/22 currently on RA. Pt semi-supine in ICU bed upon PT arrival, agreeable to evaluation. Pt A&O x 4. Based on the objective data described, the patient presents with generalized weakness, impaired functional mobility, impaired amb, impaired balance, and decreased activity tolerance. (See below for objective details and assist levels). Overall pt tolerated session fair today with c/o 7/10 right ankle pain with mobility (0/10 at rest), c/o nonsustained dizziness initially after transferring to EOB, no c/o dizziness with standing. Pt demonstrates steady, hop to head of bed utilizing RW and gt belt, while maintain NWB on RLE. Mobility limited this session due to no recliner being available and wound vac line. Pt will benefit from continued skilled PT to address above deficits and return to PLOF. Current PT DC recommendation To Be Determined pending course of stay once medically appropriate.     Current Level of Function Impacting Discharge (mobility/balance): SBA to CGA and additional time    Other factors to consider for discharge: severity of deficits, I at baseline, good family support      PLAN :  Recommendations and Planned Interventions: bed mobility training, transfer training, gait training, therapeutic exercises, patient and family training/education, and therapeutic activities      Recommend for staff: Out of bed to chair for meals, LE elevation for management of edema, and use of BSC for toileting    Frequency/Duration: Patient will be followed by physical therapy:  3-5x/week to address goals. Recommendation for discharge: (in order for the patient to meet his/her long term goals)  To Be Determined    This discharge recommendation:  Has been made in collaboration with the attending provider and/or case management    IF patient discharges home will need the following DME: rolling walker, wheelchair, and 3:1 commode         SUBJECTIVE:   Patient stated is this going to hurt?      OBJECTIVE DATA SUMMARY:   HISTORY:    History reviewed. No pertinent past medical history. History reviewed. No pertinent surgical history. Home Situation  Home Environment: Private residence  # Steps to Enter: 4  Rails to Enter: No  One/Two Story Residence: Two story, live on 1st floor  # of Interior Steps:  (Client intubated/sedated on admission; unable to answer)  Height of Each Step (in):  (Client intubated/sedated on admission; unable to answer)  [employer]:  (Client intubated/sedated on admission; unable to answer)  Lift Chair Available: No  Living Alone: No  Support Systems: Parent(s)  Patient Expects to be Discharged to[de-identified] Home  Current DME Used/Available at Home: None    EXAMINATION/PRESENTATION/DECISION MAKING:   Critical Behavior:  Neurologic State: Alert  Orientation Level: Oriented X4  Cognition: Decreased attention/concentration, Decreased command following, Poor safety awareness     Hearing: Auditory  Auditory Impairment: None  Skin:  ex fix right LE   Edema: localized right LE, no pitting noted; propped on 2 pillows at end of session.  Discussed use of ice with nsg  Range Of Motion:  AROM: Generally decreased, functional           PROM: Generally decreased, functional           Strength:    Strength: Generally decreased, functional                    Tone & Sensation:                                  Coordination:     Vision:      Functional Mobility:  Bed Mobility:  Rolling: Stand-by assistance  Supine to Sit: Stand-by assistance  Sit to Supine: Stand-by assistance  Scooting: Stand-by assistance  Transfers:  Sit to Stand: Contact guard assistance; Additional time  Stand to Sit: Contact guard assistance; Additional time                       Balance:   Sitting: Intact; Without support  Standing: Intact; With support  Ambulation/Gait Training:  Distance (ft): 2 Feet (ft)  Assistive Device: Gait belt;Walker, rolling  Ambulation - Level of Assistance: Contact guard assistance     Gait Description (WDL): Exceptions to WDL     Right Side Weight Bearing: Non-weight bearing     Base of Support: Shift to right     Speed/Gwendolyn: Slow                       Stairs: Therapeutic Exercises:   Pt educated on LE HEP to include toe curls, quad sets, and glut sets on right; ankle pumps and heelslides on left, instructed to complete throughout the day to improve ROM and strength with good understanding verbalized and demonstrated. Functional Measure:  [address] [facility] [address] [id number] AM-PAC 6 Clicks         Basic Mobility Inpatient Short Form  How much difficulty does the patient currently have. .. Unable A Lot A Little None   1. Turning over in bed (including adjusting bedclothes, sheets and blankets)? [] 1   [] 2   [x] 3   [] 4   2. Sitting down on and standing up from a chair with arms ( e.g., wheelchair, bedside commode, etc.)   [] 1   [] 2   [x] 3   [] 4   3. Moving from lying on back to sitting on the side of the bed? [] 1   [] 2   [x] 3   [] 4          How much help from another person does the patient currently need. .. Total A Lot A Little None   4. Moving to and from a bed to a chair (including a wheelchair)? [] 1   [] 2   [x] 3   [] 4   5. Need to walk in hospital room? [] 1   [] 2   [x] 3   [] 4   6. Climbing 3-5 steps with a railing?    [] 1   [x] 2   [] 3   [] 4   © , Trustees of [address] [place] [address] [address] [id number], under license to [employer]. All rights reserved     Score:  Initial:  Most Recent: X (Date: 10/8/22 )   Interpretation of Tool:  Represents activities that are increasingly more difficult (i.e. Bed mobility, Transfers, Gait). Score 24 23 22-20 19-15 14-10 9-7 6   Modifier CH CI CJ CK CL CM CN         Physical Therapy Evaluation Charge Determination   History Examination Presentation Decision-Making   HIGH Complexity :3+ comorbidities / personal factors will impact the outcome/ POC  HIGH Complexity : 4+ Standardized tests and measures addressing body structure, function, activity limitation and / or participation in recreation  LOW Complexity : Stable, uncomplicated  Other outcome measures ampac 6  mod      Based on the above components, the patient evaluation is determined to be of the following complexity level: LOW     Pain Ratin/10 at rest 7/10 right ankle with mobility and gravity dependent position    Activity Tolerance:   Fair and requires rest breaks    After treatment patient left in no apparent distress:   Bed locked and in lowest position Supine in bed, Call bell within reach, Side rails x 3, and right foot propped (education given to avoid pillow the encouraged right knee flexion)  and nsg updated. GOALS:    Problem: Mobility Impaired (Adult and Pediatric)  Goal: *Acute Goals and Plan of Care (Insert Text)  Description: FUNCTIONAL STATUS PRIOR TO ADMISSION: Patient was independent and active without use of DME.    HOME SUPPORT PRIOR TO ADMISSION: The patient lived alone with no local support. Physical Therapy Goals  Initiated 10/8/2022  Pt stated goal: to get better  Pt will be I with LE HEP in 7 days. Pt will perform bed mobility with mod I in 7 days. Pt will perform transfers with mod I in 7 days. Pt will amb 25-50 feet with LRAD safely with mod I in 7 days.        Outcome: Not Met       COMMUNICATION/EDUCATION:   The patients plan of care was discussed with: Registered nurse and Physician. Fall prevention education was provided and the patient/caregiver indicated understanding., Patient/family have participated as able in goal setting and plan of care. , and Patient/family agree to work toward stated goals and plan of care.      Thank you for this referral.  Nam Hunter, PT, DPT   Time Calculation: 29 mins

## 2022-10-08 NOTE — PROGRESS NOTES
Problem: Falls - Risk of  Goal: *Absence of Falls  Description: Document Finesse Garcia Fall Risk and appropriate interventions in the flowsheet.   Outcome: Progressing Towards Goal  Note: Fall Risk Interventions:       Mentation Interventions: Adequate sleep, hydration, pain control, Door open when patient unattended, Evaluate medications/consider consulting pharmacy, Update white board, Toileting rounds, Room close to nurse's station, Reorient patient, More frequent rounding, Increase mobility    Medication Interventions: Bed/chair exit alarm, Evaluate medications/consider consulting pharmacy, Patient to call before getting OOB    Elimination Interventions: Bed/chair exit alarm, Call light in reach, Toileting schedule/hourly rounds, Patient to call for help with toileting needs    History of Falls Interventions: Bed/chair exit alarm, Investigate reason for fall, Room close to nurse's station         Problem: Patient Education: Go to Patient Education Activity  Goal: Patient/Family Education  Outcome: Progressing Towards Goal

## 2022-10-08 NOTE — PROGRESS NOTES
[address] [facility] SURGERY PROGRESS NOTE           Chief Complaint: right foot pain    History of Present Illness:    Mr/MsJanneth Dyer is a 24y.o. year old * female presents to ER after a fall and sustained right ankle fracture. She also was presented with altered mental status and had to be intubated. Underwent right ankle ORIF with wound vac placement. Admitted to Trauma service for further management. Extubated & tolerating diet. Worked with PT today. Past Medical History: History reviewed. No pertinent past medical history. Past Surgical History: History reviewed. No pertinent surgical history. Allergy:No Known Allergies    Social History:  reports current alcohol use. She reports current drug use. Frequency: 7.00 times per week. Drug: Marijuana.      Family History:  Family History   Problem Relation Age of Onset    Psychotic Disorder Maternal Uncle         Current Medications:  Current Facility-Administered Medications:     sodium chloride (NS) flush 5-40 mL, 5-40 mL, IntraVENous, Q8H, Shoaib Bundy MD, 10 mL at 10/08/22 0516    sodium chloride (NS) flush 5-40 mL, 5-40 mL, IntraVENous, PRN, Yuan Paniagua MD    enoxaparin (LOVENOX) injection 30 mg, 30 mg, SubCUTAneous, Q24H, Yuan Paniagua MD    lactated Ringers infusion, 125 mL/hr, IntraVENous, CONTINUOUS, Amy Serra MD, Last Rate: 125 mL/hr at 10/08/22 0400, 125 mL/hr at 10/08/22 0400    piperacillin-tazobactam (ZOSYN) 3.375 g in 0.9% sodium chloride (MBP/ADV) 100 mL MBP, 3.375 g, IntraVENous, Q8H, Michoacano Giraldo MD, Last Rate: 25 mL/hr at 10/08/22 0400, 3.375 g at 10/08/22 0400    ketorolac (TORADOL) injection 15 mg, 15 mg, IntraVENous, Q6H, Keli Serra MD, 15 mg at 10/08/22 0515    OLANZapine (ZyPREXA) tablet 5 mg, 5 mg, Oral, Q12H, Cristi Davis MD, 5 mg at 10/08/22 0908    OLANZapine (ZyPREXA) injection 10 mg, 10 mg, IntraMUSCular, Q8H PRN, Andrea Moyer MD    morphine injection 4 mg, 4 mg, IntraVENous, Q4H PRN, Keli Serra MD, 4 mg at 10/07/22 1522    LORazepam (ATIVAN) injection 1 mg, 1 mg, IntraVENous, Q4H PRN, Michoacano Giraldo MD    oxyCODONE IR (ROXICODONE) tablet 5 mg, 5 mg, Oral, Q4H PRN, Florencio HERNANDEZ MD, 5 mg at 10/08/22 0916    polyethylene glycol (MIRALAX) packet 17 g, 17 g, Oral, DAILY, Malika Cornelius MD, 17 g at 10/08/22 0911    docusate sodium (COLACE) capsule 100 mg, 100 mg, Oral, BID PRN, Bailee Durham MD    senna (SENOKOT) tablet 8.6 mg, 1 Tablet, Oral, DAILY, Florencio HERNANDEZ MD, 8.6 mg at 10/08/22 0908     Immunization History:   Most Recent Immunizations   Administered Date(s) Administered    Td, Adsorbed 10/07/2022      Complete    Review of Systems:     Constitutional:  no fever,  no chills,  no sweats, No weakness, No fatigue, No decreased activity. Eye: No recent visual problem, No icterus, No discharge, No double vision. Ear/Nose/Mouth/Throat: No decreased hearing, No ear pain, No nasal congestion, No sore throat. Respiratory: No shortness of breath, No cough, No sputum production, No hemoptysis, No wheezing, No cyanosis. Cardiovascular: No chest pain, No palpitations, No bradycardia, No tachycardia, No peripheral edema, No syncope. Gastrointestinal: No nausea,  No vomiting, No diarrhea, No constipation, No heartburn,  No abdominal pain. Genitourinary: No dysuria, No hematuria, No change in urine stream, No urethral discharge, No lesions. Hematology/Lymphatics: No bruising tendency, No bleeding tendency, No petechiae, No swollen lymph glands. Endocrine: No excessive thirst, No polyuria, No cold intolerance, No heat intolerance, No excessive hunger. Immunologic: Not immunocompromised, No recurrent fevers, No recurrent infections. Musculoskeletal: No back pain, No neck pain, No joint pain, No muscle pain, No claudication, No decreased range of motion, No trauma. Integumentary: No rash, No pruritus, No abrasions.   Neurologic: Alert and oriented X4, No abnormal balance, No headache, No confusion, No numbness, No tingling. Psychiatric: No anxiety, No depression, No dickson. Physical Exam:     Vitals & Measurements: Wt Readings from Last 3 Encounters:   10/07/22 77.1 kg (170 lb)     Temp Readings from Last 3 Encounters:   10/08/22 98.1 °F (36.7 °C)     BP Readings from Last 3 Encounters:   10/08/22 127/75     Pulse Readings from Last 3 Encounters:   10/08/22 92      Ht Readings from Last 3 Encounters:   10/07/22 5' 7\" (1.702 m)          General: well appearing, no acute distress  Head: Normal  Face: Nornal  HEENT: atraumatic, PERRLA, moist mucosa, normal pharynx, normal tonsils and adenoids, normal tongue, no fluid in sinuses  Neck: Trachea midline, no carotid bruit, no masses  Chest: Normal.  Respiratory: Normal chest wall expansion, CTA B, no r/r/w, no rubs  Cardiovascular: RRR, no m/r/g, Normal S1 and S2  Abdomen: Soft, non tender, non-distended, normal bowel sounds in all quadrants, no hepatosplenomegaly, no tympany. Incision scar:   Genitourinary: No inguinal hernia, normal external gentalia, no renal angle tenderness, george+  Rectal: deferred  Musculoskeletal: normal ROM in upper and lower extremities, No joint swelling.  Right ankle external fixator with wound vac+Pulse 2+  Integumentary: Warm, dry, and pink, with no rash, purpura, or petechia  Heme/Lymph: No lymphadenopathy, no bruises  Neurological:Cranial Nerves II-XII grossly intact, no gross motor or sensory deficit  Psychiatric: Cooperative with normal mood, affect, and cognition      Laboratory Values:   Recent Results (from the past 24 hour(s))   LACTIC ACID    Collection Time: 10/07/22  5:40 PM   Result Value Ref Range    Lactic acid 0.8 0.4 - 2.0 mmol/L   CBC WITH AUTOMATED DIFF    Collection Time: 10/08/22  3:00 AM   Result Value Ref Range    WBC 6.0 3.6 - 11.0 K/uL    RBC 3.73 (L) 3.80 - 5.20 M/uL    HGB 8.9 (L) 11.5 - 16.0 g/dL    HCT 28.1 (L) 35.0 - 47.0 %    MCV 75.3 (L) 80.0 - 99.0 FL MCH 23.9 (L) 26.0 - 34.0 PG    MCHC 31.7 30.0 - 36.5 g/dL    RDW 17.4 (H) 11.5 - 14.5 %    PLATELET 806 703 - 580 K/uL    MPV 10.0 8.9 - 12.9 FL    NRBC 0.0 0.0  WBC    ABSOLUTE NRBC 0.00 0.00 - 0.01 K/uL    NEUTROPHILS 84 (H) 32 - 75 %    LYMPHOCYTES 13 12 - 49 %    MONOCYTES 3 (L) 5 - 13 %    EOSINOPHILS 0 0 - 7 %    BASOPHILS 0 0 - 1 %    IMMATURE GRANULOCYTES 0 0 - 0.5 %    ABS. NEUTROPHILS 5.0 1.8 - 8.0 K/UL    ABS. LYMPHOCYTES 0.8 0.8 - 3.5 K/UL    ABS. MONOCYTES 0.2 0.0 - 1.0 K/UL    ABS. EOSINOPHILS 0.0 0.0 - 0.4 K/UL    ABS. BASOPHILS 0.0 0.0 - 0.1 K/UL    ABS. IMM. GRANS. 0.0 0.00 - 0.04 K/UL    DF AUTOMATED     METABOLIC PANEL, COMPREHENSIVE    Collection Time: 10/08/22  3:00 AM   Result Value Ref Range    Sodium 139 136 - 145 mmol/L    Potassium 4.2 3.5 - 5.1 mmol/L    Chloride 109 (H) 97 - 108 mmol/L    CO2 24 21 - 32 mmol/L    Anion gap 6 5 - 15 mmol/L    Glucose 115 (H) 65 - 100 mg/dL    BUN 8 6 - 20 mg/dL    Creatinine 0.47 (L) 0.55 - 1.02 mg/dL    BUN/Creatinine ratio 17 12 - 20      eGFR >60 >60 ml/min/1.73m2    Calcium 8.0 (L) 8.5 - 10.1 mg/dL    Bilirubin, total 0.7 0.2 - 1.0 mg/dL    AST (SGOT) 31 15 - 37 U/L    ALT (SGPT) 26 12 - 78 U/L    Alk. phosphatase 80 45 - 117 U/L    Protein, total 5.9 (L) 6.4 - 8.2 g/dL    Albumin 3.1 (L) 3.5 - 5.0 g/dL    Globulin 2.8 2.0 - 4.0 g/dL    A-G Ratio 1.1 1.1 - 2.2           XR CHEST PORT   Final Result   No acute process identified. CT TIB/FIB RT WO CONT         XR CHEST PORT   Final Result   1. Lungs are hypoventilatory but grossly clear. XR FLUOROSCOPY UNDER 60 MINUTES   Final Result   Portable fluoroscopy. INTERPRETATION PROVIDED FOR COMPLIANCE ONLY AT NO CHARGE                         XR CHEST PORT   Final Result      Satisfactory endotracheal tube placement. Stable low lung volumes with bibasilar   atelectasis. CT HEAD WO CONT   Final Result      No acute intracranial abnormality.           CT SPINE CERV WO CONT Final Result   No acute abnormality. XR CHEST PORT   Final Result      Satisfactory endotracheal tube placement. Low lung volumes with bibasilar   atelectasis. XR ANKLE RT AP/LAT   Final Result   Acute displaced fracture articular fractures of the distal tibia and   fibula. Soft tissue gas. Assessment:  S/P fall  Right ankle fracture  S/P External fixation with wound vac by Ortho         Plan:    Admission  Diet: Regular   IV fluids: Heplock  SCD  IS  Pain medications  Antibiotics  Nausea medication  Sarabia  Labs in am  Consult: Ortho/Pulmonary & Psche  Awaiting for plan by Ortho. Thank you for the consultation & allowing me to participate in the care of this patient.
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
## 2022-10-08 NOTE — PROGRESS NOTES
Laurie Baig  [id number]  10/8/2022    Subjective:  - pain 5/10  -No complaints of nausea or vomiting  -No complaints of chest pain  -Sleeping in the bed comfortably, not listening to the iPad      Objective:  -External fixator in situ  -Pin tract intact  -Wound VAC in situ, functioning  -Dressing clean dry and intact  -No abnormal swelling  -Toes movement full  -Dorsalis pedis +2  -Sensation intact   -SCDs in situ    Vitals:  stable       Labs:  Recent Results (from the past 24 hour(s))   LACTIC ACID    Collection Time: 10/07/22  5:40 PM   Result Value Ref Range    Lactic acid 0.8 0.4 - 2.0 mmol/L   CBC WITH AUTOMATED DIFF    Collection Time: 10/08/22  3:00 AM   Result Value Ref Range    WBC 6.0 3.6 - 11.0 K/uL    RBC 3.73 (L) 3.80 - 5.20 M/uL    HGB 8.9 (L) 11.5 - 16.0 g/dL    HCT 28.1 (L) 35.0 - 47.0 %    MCV 75.3 (L) 80.0 - 99.0 FL    MCH 23.9 (L) 26.0 - 34.0 PG    MCHC 31.7 30.0 - 36.5 g/dL    RDW 17.4 (H) 11.5 - 14.5 %    PLATELET 177 742 - 769 K/uL    MPV 10.0 8.9 - 12.9 FL    NRBC 0.0 0.0  WBC    ABSOLUTE NRBC 0.00 0.00 - 0.01 K/uL    NEUTROPHILS 84 (H) 32 - 75 %    LYMPHOCYTES 13 12 - 49 %    MONOCYTES 3 (L) 5 - 13 %    EOSINOPHILS 0 0 - 7 %    BASOPHILS 0 0 - 1 %    IMMATURE GRANULOCYTES 0 0 - 0.5 %    ABS. NEUTROPHILS 5.0 1.8 - 8.0 K/UL    ABS. LYMPHOCYTES 0.8 0.8 - 3.5 K/UL    ABS. MONOCYTES 0.2 0.0 - 1.0 K/UL    ABS. EOSINOPHILS 0.0 0.0 - 0.4 K/UL    ABS. BASOPHILS 0.0 0.0 - 0.1 K/UL    ABS. IMM.  GRANS. 0.0 0.00 - 0.04 K/UL    DF AUTOMATED     METABOLIC PANEL, COMPREHENSIVE    Collection Time: 10/08/22  3:00 AM   Result Value Ref Range    Sodium 139 136 - 145 mmol/L    Potassium 4.2 3.5 - 5.1 mmol/L    Chloride 109 (H) 97 - 108 mmol/L    CO2 24 21 - 32 mmol/L    Anion gap 6 5 - 15 mmol/L    Glucose 115 (H) 65 - 100 mg/dL    BUN 8 6 - 20 mg/dL    Creatinine 0.47 (L) 0.55 - 1.02 mg/dL    BUN/Creatinine ratio 17 12 - 20      eGFR >60 >60 ml/min/1.73m2    Calcium 8.0 (L) 8.5 - 10.1 mg/dL Bilirubin, total 0.7 0.2 - 1.0 mg/dL    AST (SGOT) 31 15 - 37 U/L    ALT (SGPT) 26 12 - 78 U/L    Alk.  phosphatase 80 45 - 117 U/L    Protein, total 5.9 (L) 6.4 - 8.2 g/dL    Albumin 3.1 (L) 3.5 - 5.0 g/dL    Globulin 2.8 2.0 - 4.0 g/dL    A-G Ratio 1.1 1.1 - 2.2            Assessment:  POD 1 I&D of the open ankle wound and external fixator placement    Plan/recommendations:  -X-rays and clinical findings discussed  -Continue IV antibiotics  -Nonweightbearing on right lower extremity  -Ankle movement exercises recommended  -DVT prophylaxis per admitting team  -I discussed about performing irrigation and debridement of medial open wound and possible wound closure tomorrow.  -We will keep her n.p.o. after midnight
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
## 2022-10-08 NOTE — PROGRESS NOTES
IMPRESSION:   Acute hypoxic respiratory failure resolved  Displaced open fracture of the right distal tibia and fibula  EtOH and polysubstance abuse  Status post open fracture right ankle irrigation and debridement application of external fixator  Hypokalemia  Additional workup outlined below  Pt is at high risk of sudden decline and decompensation with life threatening consequenses and continued end organ dysfunction and failure  Pt is critically ill. Time spent with pt and staff actively rendering care, managing pt and coordinating care as stated below; 30 minutes, exclusive of any procedures      RECOMMENDATIONS/PLAN:   ICU monitoring  Extubated yesterday now on room air  Potassium normal  Sedated with fentanyl propofol continue with the pain medication  Transfuse prn to maintain Hgb > 7  Labs to follow electrolytes, renal function and and blood counts  Bronchial hygiene with respiratory therapy techniques, bronchodilators  Pt needs IV fluids with additives and Drug therapy requiring intensive monitoring for toxicity  Prescription drug management with home med reconciliation reviewed  DVT, SUP prophylaxis  Will be available to assist in medical management while in the CCU pending disposition     [x] High complexity decision making was performed  [x] See my orders for details  HPI  49-year-old lady 4 weeks post partum came to the ED via EMS after jumping off a pillar outside of a restaurant and sustaining a open tib/fib fracture. The mother states that the patient has always been slightly off but since delivering her child has become increasingly different. The mother states that she does not speak with the family, recently sold all of her possessions, and moved into and air [facility] although the mother states that she had the option to live with multiple family members. The mother states that she has a relative diagnosed with schizoaffective disorder and a another relative has bipolar schizophrenia.  The patient was combative in the ED and leading up to the surgery at which point she was sedated and intubated. PMH:  has no past medical history on file. PSH:   has no past surgical history on file. FHX: family history includes Psychotic Disorder in her maternal uncle. SHX:  reports current alcohol use. She reports current drug use. Frequency: 7.00 times per week. Drug: Marijuana. ALL: No Known Allergies     MEDS:   [x] Reviewed - As Below   [] Not reviewed    Current Facility-Administered Medications   Medication    sodium chloride (NS) flush 5-40 mL    sodium chloride (NS) flush 5-40 mL    enoxaparin (LOVENOX) injection 30 mg    lactated Ringers infusion    piperacillin-tazobactam (ZOSYN) 3.375 g in 0.9% sodium chloride (MBP/ADV) 100 mL MBP    ketorolac (TORADOL) injection 15 mg    OLANZapine (ZyPREXA) tablet 5 mg    OLANZapine (ZyPREXA) injection 10 mg    morphine injection 4 mg    LORazepam (ATIVAN) injection 1 mg    oxyCODONE IR (ROXICODONE) tablet 5 mg    polyethylene glycol (MIRALAX) packet 17 g    docusate sodium (COLACE) capsule 100 mg    senna (SENOKOT) tablet 8.6 mg      MAR reviewed and pertinent medications noted or modified as needed   Current Facility-Administered Medications   Medication    sodium chloride (NS) flush 5-40 mL    sodium chloride (NS) flush 5-40 mL    enoxaparin (LOVENOX) injection 30 mg    lactated Ringers infusion    piperacillin-tazobactam (ZOSYN) 3.375 g in 0.9% sodium chloride (MBP/ADV) 100 mL MBP    ketorolac (TORADOL) injection 15 mg    OLANZapine (ZyPREXA) tablet 5 mg    OLANZapine (ZyPREXA) injection 10 mg    morphine injection 4 mg    LORazepam (ATIVAN) injection 1 mg    oxyCODONE IR (ROXICODONE) tablet 5 mg    polyethylene glycol (MIRALAX) packet 17 g    docusate sodium (COLACE) capsule 100 mg    senna (SENOKOT) tablet 8.6 mg      PMH:  has no past medical history on file. PSH:   has no past surgical history on file.    FHX: family history includes Psychotic Disorder in her maternal uncle. SHX:  reports current alcohol use. She reports current drug use. Frequency: 7.00 times per week. Drug: Marijuana. ROS:  Review of system done as mentioned in the HPI and physical exam    Hemodynamics:    CO:    CI:    CVP:    SVR:   PAP Systolic:    PAP Diastolic:    PVR:    IE45:        Ventilator Settings:      Mode Rate TV Press PEEP FiO2 PIP Min. Vent   Assist control, Volume control    460 ml    5 cm H20 35 %  19 cm H2O  7.8 l/min        Vital Signs: Telemetry:    normal sinus rhythm Intake/Output:   Visit Vitals  /61 (BP 1 Location: Right upper arm, BP Patient Position: At rest)   Pulse 74   Temp 98.1 °F (36.7 °C)   Resp 17   Ht 5' 7\" (1.702 m)   Wt 77.1 kg (170 lb)   SpO2 100%   Breastfeeding No Comment: Mother states not breastfeeding at this time   BMI 26.63 kg/m²       Temp (24hrs), Av.6 °F (37 °C), Min:98.1 °F (36.7 °C), Max:98.9 °F (37.2 °C)        O2 Device: None (Room air) O2 Flow Rate (L/min): 2 l/min       Wt Readings from Last 4 Encounters:   10/07/22 77.1 kg (170 lb)          Intake/Output Summary (Last 24 hours) at 10/8/2022 0827  Last data filed at 10/8/2022 0400  Gross per 24 hour   Intake 2821.06 ml   Output 3125 ml   Net -303.94 ml         Last shift:      No intake/output data recorded. Last 3 shifts: 10/06 1901 - 10/08 0700  In: 4421.1 [P.O.:30; I.V.:4391.1]  Out: 3623 [PTXBC:6860; Drains:150]       Physical Exam:     General: Alert awake on room air  HEENT: NCAT, poor dentition, lips and mucosa dry  Eyes: anicteric; conjunctiva clear  Neck: no nodes, trach midline; no accessory MM use.   Chest: no deformity,   Cardiac: IR regular; no murmur;   Lungs: distant breath sounds; no wheezes  Abd: soft, NT, hypoactive BS  Ext: Right lower extremity cast with external fixator rods in place  : NO george, clear urine  Neuro: sedated with fentanyl and propofol  Psych- unable to assess  Skin: warm, dry, no cyanosis;   Pulses: 1-2+ Bilateral pedal, radial  Capillary: brisk; pale      DATA:    MAR reviewed and pertinent medications noted or modified as needed  MEDS:   Current Facility-Administered Medications   Medication    sodium chloride (NS) flush 5-40 mL    sodium chloride (NS) flush 5-40 mL    enoxaparin (LOVENOX) injection 30 mg    lactated Ringers infusion    piperacillin-tazobactam (ZOSYN) 3.375 g in 0.9% sodium chloride (MBP/ADV) 100 mL MBP    ketorolac (TORADOL) injection 15 mg    OLANZapine (ZyPREXA) tablet 5 mg    OLANZapine (ZyPREXA) injection 10 mg    morphine injection 4 mg    LORazepam (ATIVAN) injection 1 mg    oxyCODONE IR (ROXICODONE) tablet 5 mg    polyethylene glycol (MIRALAX) packet 17 g    docusate sodium (COLACE) capsule 100 mg    senna (SENOKOT) tablet 8.6 mg        Labs:    Recent Labs     10/08/22  0300 10/07/22  1115 10/07/22  0217   WBC 6.0 6.6 8.5   HGB 8.9* 9.2* 11.3*    230 335   INR  --   --  1.1   APTT  --   --  24.5       Recent Labs     10/08/22  0300 10/07/22  1740 10/07/22  1115 10/07/22  0217     --  139 138   K 4.2  --  4.2 2.9*   *  --  110* 106   CO2 24  --  23 20*   *  --  89 163*   BUN 8  --  11 19   CREA 0.47*  --  0.64 1.21*   CA 8.0*  --  7.5* 8.3*   LAC  --  0.8 3.3* 7.4*   ALB 3.1*  --   --  3.6   ALT 26  --   --  32       Recent Labs     10/07/22  1045 10/07/22  0421   PH 7.42 7.33*   PCO2 31* 38   PO2 268* 163*   HCO3 20* 20*   FIO2 50 30       No results for input(s): CPK, CKNDX, TROIQ in the last 72 hours. No lab exists for component: CPKMB  No results found for: BNPP, BNP   No results found for: CULT  No results found for: TSH, TSHEXT, TSHEXT     Imaging:    Results from Hospital Encounter encounter on 10/07/22    XR CHEST PORT    Narrative  Clinical history: chf  INDICATION:   chf  COMPARISON: 10/7/2022    FINDINGS:  AP portable upright view of the chest demonstrates a stable  cardiopericardial  silhouette. There is no pleural effusion. .There is no focal consolidation. .There  is no pneumothorax. Cloteal Pelayo Patient is on a cardiac monitor. Removal of ET tube. Impression  No acute process identified.           10/8 extubated yesterday now she is on room air electrolytes normal possible back to the OR soon for second procedure for distal tibia-fibula fracture which is stabilized CT of the leg stable

## 2022-10-09 LAB
ALBUMIN SERPL-MCNC: 2.6 G/DL (ref 3.5–5)
ALBUMIN/GLOB SERPL: 1 {RATIO} (ref 1.1–2.2)
ALP SERPL-CCNC: 67 U/L (ref 45–117)
ALT SERPL-CCNC: 20 U/L (ref 12–78)
ANION GAP SERPL CALC-SCNC: 5 MMOL/L (ref 5–15)
AST SERPL W P-5'-P-CCNC: 17 U/L (ref 15–37)
ATRIAL RATE: 167 BPM
BASOPHILS # BLD: 0 K/UL (ref 0–0.1)
BASOPHILS NFR BLD: 0 % (ref 0–1)
BILIRUB SERPL-MCNC: 0.1 MG/DL (ref 0.2–1)
BUN SERPL-MCNC: 24 MG/DL (ref 6–20)
BUN/CREAT SERPL: 37 (ref 12–20)
CA-I BLD-MCNC: 7.8 MG/DL (ref 8.5–10.1)
CALCULATED P AXIS, ECG09: 45 DEGREES
CALCULATED R AXIS, ECG10: 47 DEGREES
CALCULATED T AXIS, ECG11: 26 DEGREES
CHLORIDE SERPL-SCNC: 113 MMOL/L (ref 97–108)
CO2 SERPL-SCNC: 26 MMOL/L (ref 21–32)
CREAT SERPL-MCNC: 0.65 MG/DL (ref 0.55–1.02)
DIAGNOSIS, 93000: NORMAL
DIFFERENTIAL METHOD BLD: ABNORMAL
EOSINOPHIL # BLD: 0 K/UL (ref 0–0.4)
EOSINOPHIL NFR BLD: 0 % (ref 0–7)
ERYTHROCYTE [DISTWIDTH] IN BLOOD BY AUTOMATED COUNT: 17.9 % (ref 11.5–14.5)
GLOBULIN SER CALC-MCNC: 2.5 G/DL (ref 2–4)
GLUCOSE SERPL-MCNC: 110 MG/DL (ref 65–100)
HCT VFR BLD AUTO: 24.1 % (ref 35–47)
HGB BLD-MCNC: 7.4 G/DL (ref 11.5–16)
IMM GRANULOCYTES # BLD AUTO: 0.1 K/UL (ref 0–0.04)
IMM GRANULOCYTES NFR BLD AUTO: 1 % (ref 0–0.5)
LYMPHOCYTES # BLD: 2.7 K/UL (ref 0.8–3.5)
LYMPHOCYTES NFR BLD: 31 % (ref 12–49)
MCH RBC QN AUTO: 23.6 PG (ref 26–34)
MCHC RBC AUTO-ENTMCNC: 30.7 G/DL (ref 30–36.5)
MCV RBC AUTO: 76.8 FL (ref 80–99)
MONOCYTES # BLD: 0.6 K/UL (ref 0–1)
MONOCYTES NFR BLD: 7 % (ref 5–13)
NEUTS SEG # BLD: 5.5 K/UL (ref 1.8–8)
NEUTS SEG NFR BLD: 61 % (ref 32–75)
NRBC # BLD: 0 K/UL (ref 0–0.01)
NRBC BLD-RTO: 0 PER 100 WBC
P-R INTERVAL, ECG05: 128 MS
PLATELET # BLD AUTO: 175 K/UL (ref 150–400)
PMV BLD AUTO: 9.9 FL (ref 8.9–12.9)
POTASSIUM SERPL-SCNC: 4 MMOL/L (ref 3.5–5.1)
PROT SERPL-MCNC: 5.1 G/DL (ref 6.4–8.2)
Q-T INTERVAL, ECG07: 280 MS
QRS DURATION, ECG06: 74 MS
QTC CALCULATION (BEZET), ECG08: 467 MS
RBC # BLD AUTO: 3.14 M/UL (ref 3.8–5.2)
SODIUM SERPL-SCNC: 144 MMOL/L (ref 136–145)
VENTRICULAR RATE, ECG03: 167 BPM
WBC # BLD AUTO: 9 K/UL (ref 3.6–11)

## 2022-10-09 PROCEDURE — 97110 THERAPEUTIC EXERCISES: CPT

## 2022-10-09 PROCEDURE — 80053 COMPREHEN METABOLIC PANEL: CPT

## 2022-10-09 PROCEDURE — 74011250637 HC RX REV CODE- 250/637: Performed by: ORTHOPAEDIC SURGERY

## 2022-10-09 PROCEDURE — 65610000006 HC RM INTENSIVE CARE

## 2022-10-09 PROCEDURE — 74011250636 HC RX REV CODE- 250/636: Performed by: INTERNAL MEDICINE

## 2022-10-09 PROCEDURE — 74011000258 HC RX REV CODE- 258: Performed by: INTERNAL MEDICINE

## 2022-10-09 PROCEDURE — 74011250636 HC RX REV CODE- 250/636: Performed by: SURGERY

## 2022-10-09 PROCEDURE — 74011250637 HC RX REV CODE- 250/637: Performed by: PSYCHIATRY & NEUROLOGY

## 2022-10-09 PROCEDURE — 74011000250 HC RX REV CODE- 250: Performed by: ORTHOPAEDIC SURGERY

## 2022-10-09 PROCEDURE — 85025 COMPLETE CBC W/AUTO DIFF WBC: CPT

## 2022-10-09 PROCEDURE — 36415 COLL VENOUS BLD VENIPUNCTURE: CPT

## 2022-10-09 PROCEDURE — 97116 GAIT TRAINING THERAPY: CPT

## 2022-10-09 RX ORDER — SODIUM CHLORIDE 9 MG/ML
75 INJECTION, SOLUTION INTRAVENOUS CONTINUOUS
Status: DISCONTINUED | OUTPATIENT
Start: 2022-10-09 | End: 2022-10-11

## 2022-10-09 RX ADMIN — POLYETHYLENE GLYCOL 3350 17 G: 17 POWDER, FOR SOLUTION ORAL at 09:18

## 2022-10-09 RX ADMIN — SODIUM CHLORIDE, PRESERVATIVE FREE 10 ML: 5 INJECTION INTRAVENOUS at 20:14

## 2022-10-09 RX ADMIN — KETOROLAC TROMETHAMINE 15 MG: 15 INJECTION, SOLUTION INTRAMUSCULAR; INTRAVENOUS at 18:38

## 2022-10-09 RX ADMIN — SENNOSIDES 8.6 MG: 8.6 TABLET, COATED ORAL at 09:17

## 2022-10-09 RX ADMIN — SODIUM CHLORIDE 50 ML/HR: 9 INJECTION, SOLUTION INTRAVENOUS at 15:53

## 2022-10-09 RX ADMIN — PIPERACILLIN AND TAZOBACTAM 3.38 G: 3; .375 INJECTION, POWDER, FOR SOLUTION INTRAVENOUS at 20:11

## 2022-10-09 RX ADMIN — SODIUM CHLORIDE, PRESERVATIVE FREE 10 ML: 5 INJECTION INTRAVENOUS at 04:36

## 2022-10-09 RX ADMIN — KETOROLAC TROMETHAMINE 15 MG: 15 INJECTION, SOLUTION INTRAMUSCULAR; INTRAVENOUS at 11:32

## 2022-10-09 RX ADMIN — KETOROLAC TROMETHAMINE 15 MG: 15 INJECTION, SOLUTION INTRAMUSCULAR; INTRAVENOUS at 05:15

## 2022-10-09 RX ADMIN — OLANZAPINE 5 MG: 10 TABLET, FILM COATED ORAL at 20:11

## 2022-10-09 RX ADMIN — OXYCODONE 5 MG: 5 TABLET ORAL at 20:11

## 2022-10-09 RX ADMIN — PIPERACILLIN AND TAZOBACTAM 3.38 G: 3; .375 INJECTION, POWDER, FOR SOLUTION INTRAVENOUS at 04:35

## 2022-10-09 RX ADMIN — OLANZAPINE 5 MG: 10 TABLET, FILM COATED ORAL at 09:18

## 2022-10-09 RX ADMIN — SODIUM CHLORIDE, PRESERVATIVE FREE 10 ML: 5 INJECTION INTRAVENOUS at 14:50

## 2022-10-09 RX ADMIN — OXYCODONE 5 MG: 5 TABLET ORAL at 15:53

## 2022-10-09 RX ADMIN — PIPERACILLIN AND TAZOBACTAM 3.38 G: 3; .375 INJECTION, POWDER, FOR SOLUTION INTRAVENOUS at 14:50

## 2022-10-09 RX ADMIN — KETOROLAC TROMETHAMINE 15 MG: 15 INJECTION, SOLUTION INTRAMUSCULAR; INTRAVENOUS at 23:52

## 2022-10-09 NOTE — PROGRESS NOTES
Problem: Falls - Risk of  Goal: *Absence of Falls  Description: Document Murphy Linares Fall Risk and appropriate interventions in the flowsheet.   Outcome: Progressing Towards Goal  Note: Fall Risk Interventions:       Mentation Interventions: Adequate sleep, hydration, pain control, Bed/chair exit alarm, Door open when patient unattended, Evaluate medications/consider consulting pharmacy, Update white board, Toileting rounds, Room close to nurse's station, Reorient patient, More frequent rounding, Increase mobility    Medication Interventions: Bed/chair exit alarm, Evaluate medications/consider consulting pharmacy, Patient to call before getting OOB    Elimination Interventions: Bed/chair exit alarm, Call light in reach, Patient to call for help with toileting needs, Toileting schedule/hourly rounds    History of Falls Interventions: Bed/chair exit alarm, Door open when patient unattended, Room close to nurse's station, Evaluate medications/consider consulting pharmacy         Problem: Patient Education: Go to Patient Education Activity  Goal: Patient/Family Education  Outcome: Progressing Towards Goal     Problem: Pain  Goal: *Control of Pain  Outcome: Progressing Towards Goal

## 2022-10-09 NOTE — PROGRESS NOTES
1900  Bedside and Verbal shift change report given to Kira (oncoming nurse) by Maddie Nelson (offgoing nurse). Report included the following information SBAR, Kardex, Intake/Output, MAR, Recent Results, and Cardiac Rhythm NSR .    2011  Patient complained of 5/10 pain to her right lower extremity. PRN Oxycodone administered. 0700  Bedside and Verbal shift change report given to Asaf Demarco (oncoming nurse) by Iesha Cardona (offgoing nurse). Report included the following information SBAR, Kardex, OR Summary, Procedure Summary, Intake/Output, MAR, Recent Results, Cardiac Rhythm NSR, and Alarm Parameters .

## 2022-10-09 NOTE — PROGRESS NOTES
[facility] [facility] SURGERY PROGRESS NOTE           Chief Complaint: non    History of Present Illness:    Mr/MsJanneth Pace is a 24y.o. year old * female presents to ER after a fall and sustained right ankle fracture. She also was presented with altered mental status and had to be intubated. Underwent right ankle ORIF with wound vac placement. Admitted to Trauma service for further management. Extubated & tolerating diet. Worked with PT today. Had low urine ouput. Hemoglobin 7.4        Past Medical History: History reviewed. No pertinent past medical history. Past Surgical History: History reviewed. No pertinent surgical history. Allergy:No Known Allergies    Social History:  reports current alcohol use. She reports current drug use. Frequency: 7.00 times per week. Drug: Marijuana.      Family History:  Family History   Problem Relation Age of Onset    Psychotic Disorder Maternal Uncle           Current Medications:  Current Facility-Administered Medications:     0.9% sodium chloride infusion, 75 mL/hr, IntraVENous, CONTINUOUS, Keli Serra MD, Last Rate: 50 mL/hr at 10/09/22 1553, 50 mL/hr at 10/09/22 1553    sodium chloride (NS) flush 5-40 mL, 5-40 mL, IntraVENous, Q8H, Bibiana Davila MD, 10 mL at 10/09/22 1450    sodium chloride (NS) flush 5-40 mL, 5-40 mL, IntraVENous, PRN, Bibiana Davila MD    enoxaparin (LOVENOX) injection 30 mg, 30 mg, SubCUTAneous, Q24H, Bibiana Davila MD    piperacillin-tazobactam (ZOSYN) 3.375 g in 0.9% sodium chloride (MBP/ADV) 100 mL MBP, 3.375 g, IntraVENous, Q8H, Michoacano Giraldo MD, Last Rate: 25 mL/hr at 10/09/22 1450, 3.375 g at 10/09/22 1450    ketorolac (TORADOL) injection 15 mg, 15 mg, IntraVENous, Q6H, Keli Serra MD, 15 mg at 10/09/22 1132    OLANZapine (ZyPREXA) tablet 5 mg, 5 mg, Oral, Q12H, Cristi Davis MD, 5 mg at 10/09/22 0918    OLANZapine (ZyPREXA) injection 10 mg, 10 mg, IntraMUSCular, Q8H PRN, Bolivar Geronimo MD morphine injection 4 mg, 4 mg, IntraVENous, Q4H PRN, Keli Serra MD, 4 mg at 10/07/22 1522    LORazepam (ATIVAN) injection 1 mg, 1 mg, IntraVENous, Q4H PRN, Michoacano Giraldo MD    oxyCODONE IR (ROXICODONE) tablet 5 mg, 5 mg, Oral, Q4H PRN, Fatuomata HERNANDEZ MD, 5 mg at 10/09/22 1553    polyethylene glycol (MIRALAX) packet 17 g, 17 g, Oral, DAILY, Fatoumata HERNANDEZ MD, 17 g at 10/09/22 7649    docusate sodium (COLACE) capsule 100 mg, 100 mg, Oral, BID PRN, Jesus Conde MD    senna (SENOKOT) tablet 8.6 mg, 1 Tablet, Oral, DAILY, Fatoumata HERNANDEZ MD, 8.6 mg at 10/09/22 7347     Immunization History:   Most Recent Immunizations   Administered Date(s) Administered    Td, Adsorbed 10/07/2022      Complete    Review of Systems:     Constitutional:  no fever,  no chills,  no sweats, No weakness, No fatigue, No decreased activity. Eye: No recent visual problem, No icterus, No discharge, No double vision. Ear/Nose/Mouth/Throat: No decreased hearing, No ear pain, No nasal congestion, No sore throat. Respiratory: No shortness of breath, No cough, No sputum production, No hemoptysis, No wheezing, No cyanosis. Cardiovascular: No chest pain, No palpitations, No bradycardia, No tachycardia, No peripheral edema, No syncope. Gastrointestinal: No nausea,  No vomiting, No diarrhea, No constipation, No heartburn,  No abdominal pain. Genitourinary: No dysuria, No hematuria, No change in urine stream, No urethral discharge, No lesions. Hematology/Lymphatics: No bruising tendency, No bleeding tendency, No petechiae, No swollen lymph glands. Endocrine: No excessive thirst, No polyuria, No cold intolerance, No heat intolerance, No excessive hunger. Immunologic: Not immunocompromised, No recurrent fevers, No recurrent infections. Musculoskeletal: No back pain, No neck pain, No joint pain, No muscle pain, No claudication, No decreased range of motion, No trauma.   Integumentary: No rash, No pruritus, No abrasions. Neurologic: Alert and oriented X4, No abnormal balance, No headache, No confusion, No numbness, No tingling. Psychiatric: No anxiety, No depression, No dickson. Physical Exam:     Vitals & Measurements: Wt Readings from Last 3 Encounters:   10/07/22 77.1 kg (170 lb)     Temp Readings from Last 3 Encounters:   10/09/22 98.6 °F (37 °C)     BP Readings from Last 3 Encounters:   10/09/22 115/67     Pulse Readings from Last 3 Encounters:   10/09/22 87      Ht Readings from Last 3 Encounters:   10/07/22 5' 7\" (1.702 m)          General: well appearing, no acute distress  Head: Normal  Face: Nornal  HEENT: atraumatic, PERRLA, moist mucosa, normal pharynx, normal tonsils and adenoids, normal tongue, no fluid in sinuses  Neck: Trachea midline, no carotid bruit, no masses  Chest: Normal.  Respiratory: Normal chest wall expansion, CTA B, no r/r/w, no rubs  Cardiovascular: RRR, no m/r/g, Normal S1 and S2  Abdomen: Soft, non tender, non-distended, normal bowel sounds in all quadrants, no hepatosplenomegaly, no tympany. Incision scar:   Genitourinary: No inguinal hernia, normal external gentalia, no renal angle tenderness, george+  Rectal: deferred  Musculoskeletal: normal ROM in upper and lower extremities, No joint swelling.  Right ankle external fixator with wound vac+Pulse 2+  Integumentary: Warm, dry, and pink, with no rash, purpura, or petechia  Heme/Lymph: No lymphadenopathy, no bruises  Neurological:Cranial Nerves II-XII grossly intact, no gross motor or sensory deficit  Psychiatric: Cooperative with normal mood, affect, and cognition      Laboratory Values:   Recent Results (from the past 24 hour(s))   CBC WITH AUTOMATED DIFF    Collection Time: 10/09/22  4:30 AM   Result Value Ref Range    WBC 9.0 3.6 - 11.0 K/uL    RBC 3.14 (L) 3.80 - 5.20 M/uL    HGB 7.4 (L) 11.5 - 16.0 g/dL    HCT 24.1 (L) 35.0 - 47.0 %    MCV 76.8 (L) 80.0 - 99.0 FL    MCH 23.6 (L) 26.0 - 34.0 PG    MCHC 30.7 30.0 - 36.5 g/dL    RDW 17.9 (H) 11.5 - 14.5 %    PLATELET 766 768 - 999 K/uL    MPV 9.9 8.9 - 12.9 FL    NRBC 0.0 0.0  WBC    ABSOLUTE NRBC 0.00 0.00 - 0.01 K/uL    NEUTROPHILS 61 32 - 75 %    LYMPHOCYTES 31 12 - 49 %    MONOCYTES 7 5 - 13 %    EOSINOPHILS 0 0 - 7 %    BASOPHILS 0 0 - 1 %    IMMATURE GRANULOCYTES 1 (H) 0 - 0.5 %    ABS. NEUTROPHILS 5.5 1.8 - 8.0 K/UL    ABS. LYMPHOCYTES 2.7 0.8 - 3.5 K/UL    ABS. MONOCYTES 0.6 0.0 - 1.0 K/UL    ABS. EOSINOPHILS 0.0 0.0 - 0.4 K/UL    ABS. BASOPHILS 0.0 0.0 - 0.1 K/UL    ABS. IMM. GRANS. 0.1 (H) 0.00 - 0.04 K/UL    DF AUTOMATED     METABOLIC PANEL, COMPREHENSIVE    Collection Time: 10/09/22  4:30 AM   Result Value Ref Range    Sodium 144 136 - 145 mmol/L    Potassium 4.0 3.5 - 5.1 mmol/L    Chloride 113 (H) 97 - 108 mmol/L    CO2 26 21 - 32 mmol/L    Anion gap 5 5 - 15 mmol/L    Glucose 110 (H) 65 - 100 mg/dL    BUN 24 (H) 6 - 20 mg/dL    Creatinine 0.65 0.55 - 1.02 mg/dL    BUN/Creatinine ratio 37 (H) 12 - 20      eGFR >60 >60 ml/min/1.73m2    Calcium 7.8 (L) 8.5 - 10.1 mg/dL    Bilirubin, total 0.1 (L) 0.2 - 1.0 mg/dL    AST (SGOT) 17 15 - 37 U/L    ALT (SGPT) 20 12 - 78 U/L    Alk. phosphatase 67 45 - 117 U/L    Protein, total 5.1 (L) 6.4 - 8.2 g/dL    Albumin 2.6 (L) 3.5 - 5.0 g/dL    Globulin 2.5 2.0 - 4.0 g/dL    A-G Ratio 1.0 (L) 1.1 - 2.2           XR CHEST PORT   Final Result   No acute process identified. CT TIB/FIB RT WO CONT   Final Result   External fixation of the lower leg. Internal fixation of pilon fracture. Distal   fibular fracture. XR CHEST PORT   Final Result   1. Lungs are hypoventilatory but grossly clear. XR FLUOROSCOPY UNDER 60 MINUTES   Final Result   Portable fluoroscopy. INTERPRETATION PROVIDED FOR COMPLIANCE ONLY AT NO CHARGE                         XR CHEST PORT   Final Result      Satisfactory endotracheal tube placement. Stable low lung volumes with bibasilar   atelectasis.          CT HEAD WO CONT   Final Result      No acute intracranial abnormality. CT SPINE CERV WO CONT   Final Result   No acute abnormality. XR CHEST PORT   Final Result      Satisfactory endotracheal tube placement. Low lung volumes with bibasilar   atelectasis. XR ANKLE RT AP/LAT   Final Result   Acute displaced fracture articular fractures of the distal tibia and   fibula. Soft tissue gas. Assessment:  S/P fall  Right ankle fracture  S/P External fixation with wound vac by Ortho         Plan:    Admission  Diet: Regular   Increase IV fluids  SCD  IS  Pain medications  Antibiotics  Nausea medication  [place]  Labs in am  Consult: Ortho/Pulmonary & Psche  Awaiting for plan by Ortho. If repeat hemoglobin in am is 7 or less will plan on transfusing pRBC. Thank you for the consultation & allowing me to participate in the care of this patient.

## 2022-10-09 NOTE — PROGRESS NOTES
IMPRESSION:   Acute hypoxic respiratory failure resolved  Displaced open fracture of the right distal tibia and fibula  EtOH and polysubstance abuse  Status post open fracture right ankle irrigation and debridement application of external fixator  Hypokalemia  Anemia hemoglobin 7.4  Additional workup outlined below  Pt is at high risk of sudden decline and decompensation with life threatening consequenses and continued end organ dysfunction and failure  Pt is critically ill. Time spent with pt and staff actively rendering care, managing pt and coordinating care as stated below; 30 minutes, exclusive of any procedures      RECOMMENDATIONS/PLAN:   ICU monitoring  Extubated on 10/7  now on room air  Potassium normal  Transfuse prn to maintain Hgb > 7  Labs to follow electrolytes, renal function and and blood counts  Bronchial hygiene with respiratory therapy techniques, bronchodilators  Pt needs IV fluids with additives and Drug therapy requiring intensive monitoring for toxicity  Prescription drug management with home med reconciliation reviewed  DVT, SUP prophylaxis  Will be available to assist in medical management while in the CCU pending disposition     [x] High complexity decision making was performed  [x] See my orders for details  HPI  24-year-old lady 4 weeks post partum came to the ED via EMS after jumping off a pillar outside of a restaurant and sustaining a open tib/fib fracture. The mother states that the patient has always been slightly off but since delivering her child has become increasingly different. The mother states that she does not speak with the family, recently sold all of her possessions, and moved into and air [facility] although the mother states that she had the option to live with multiple family members. The mother states that she has a relative diagnosed with schizoaffective disorder and a another relative has bipolar schizophrenia.  The patient was combative in the ED and leading up to the surgery at which point she was sedated and intubated. PMH:  has no past medical history on file. PSH:   has no past surgical history on file. FHX: family history includes Psychotic Disorder in her maternal uncle. SHX:  reports current alcohol use. She reports current drug use. Frequency: 7.00 times per week. Drug: Marijuana. ALL: No Known Allergies     MEDS:   [x] Reviewed - As Below   [] Not reviewed    Current Facility-Administered Medications   Medication    sodium chloride (NS) flush 5-40 mL    sodium chloride (NS) flush 5-40 mL    enoxaparin (LOVENOX) injection 30 mg    piperacillin-tazobactam (ZOSYN) 3.375 g in 0.9% sodium chloride (MBP/ADV) 100 mL MBP    ketorolac (TORADOL) injection 15 mg    OLANZapine (ZyPREXA) tablet 5 mg    OLANZapine (ZyPREXA) injection 10 mg    morphine injection 4 mg    LORazepam (ATIVAN) injection 1 mg    oxyCODONE IR (ROXICODONE) tablet 5 mg    polyethylene glycol (MIRALAX) packet 17 g    docusate sodium (COLACE) capsule 100 mg    senna (SENOKOT) tablet 8.6 mg      MAR reviewed and pertinent medications noted or modified as needed   Current Facility-Administered Medications   Medication    sodium chloride (NS) flush 5-40 mL    sodium chloride (NS) flush 5-40 mL    enoxaparin (LOVENOX) injection 30 mg    piperacillin-tazobactam (ZOSYN) 3.375 g in 0.9% sodium chloride (MBP/ADV) 100 mL MBP    ketorolac (TORADOL) injection 15 mg    OLANZapine (ZyPREXA) tablet 5 mg    OLANZapine (ZyPREXA) injection 10 mg    morphine injection 4 mg    LORazepam (ATIVAN) injection 1 mg    oxyCODONE IR (ROXICODONE) tablet 5 mg    polyethylene glycol (MIRALAX) packet 17 g    docusate sodium (COLACE) capsule 100 mg    senna (SENOKOT) tablet 8.6 mg      PMH:  has no past medical history on file. PSH:   has no past surgical history on file. FHX: family history includes Psychotic Disorder in her maternal uncle. SHX:  reports current alcohol use. She reports current drug use.  Frequency: 7.00 times per week. Drug: Marijuana. ROS:  Review of system done as mentioned in the HPI and physical exam    Hemodynamics:    CO:    CI:    CVP:    SVR:   PAP Systolic:    PAP Diastolic:    PVR:    AA23:        Ventilator Settings:      Mode Rate TV Press PEEP FiO2 PIP Min. Vent   Assist control, Volume control    460 ml    5 cm H20 35 %  19 cm H2O  7.8 l/min        Vital Signs: Telemetry:    normal sinus rhythm Intake/Output:   Visit Vitals  /63 (BP 1 Location: Right upper arm, BP Patient Position: At rest)   Pulse 86   Temp 98.4 °F (36.9 °C)   Resp 14   Ht 5' 7\" (1.702 m)   Wt 77.1 kg (170 lb)   SpO2 100%   Breastfeeding No Comment: Mother states not breastfeeding at this time   BMI 26.63 kg/m²       Temp (24hrs), Av.3 °F (36.8 °C), Min:98.2 °F (36.8 °C), Max:98.4 °F (36.9 °C)        O2 Device: None (Room air) O2 Flow Rate (L/min): 2 l/min       Wt Readings from Last 4 Encounters:   10/07/22 77.1 kg (170 lb)          Intake/Output Summary (Last 24 hours) at 10/9/2022 0910  Last data filed at 10/9/2022 0527  Gross per 24 hour   Intake 2848. 75 ml   Output 965 ml   Net 1883.75 ml         Last shift:      No intake/output data recorded. Last 3 shifts: 10/07 1901 - 10/09 0700  In: 4205 [P.O.:480; I.V.:3725]  Out: 9704 [Urine:2690; Drains:150]       Physical Exam:     General: Alert awake on room air  HEENT: NCAT, poor dentition, lips and mucosa dry  Eyes: anicteric; conjunctiva clear  Neck: no nodes, trach midline; no accessory MM use.   Chest: no deformity,   Cardiac: IR regular; no murmur;   Lungs: distant breath sounds; no wheezes  Abd: soft, NT, hypoactive BS  Ext: Right lower extremity cast with external fixator rods in place  : NO george, clear urine  Neuro: sedated with fentanyl and propofol  Psych- unable to assess  Skin: warm, dry, no cyanosis;   Pulses: 1-2+ Bilateral pedal, radial  Capillary: brisk; pale      DATA:    MAR reviewed and pertinent medications noted or modified as needed  MEDS: Current Facility-Administered Medications   Medication    sodium chloride (NS) flush 5-40 mL    sodium chloride (NS) flush 5-40 mL    enoxaparin (LOVENOX) injection 30 mg    piperacillin-tazobactam (ZOSYN) 3.375 g in 0.9% sodium chloride (MBP/ADV) 100 mL MBP    ketorolac (TORADOL) injection 15 mg    OLANZapine (ZyPREXA) tablet 5 mg    OLANZapine (ZyPREXA) injection 10 mg    morphine injection 4 mg    LORazepam (ATIVAN) injection 1 mg    oxyCODONE IR (ROXICODONE) tablet 5 mg    polyethylene glycol (MIRALAX) packet 17 g    docusate sodium (COLACE) capsule 100 mg    senna (SENOKOT) tablet 8.6 mg        Labs:    Recent Labs     10/09/22  0430 10/08/22  0300 10/07/22  1115 10/07/22  0217   WBC 9.0 6.0 6.6 8.5   HGB 7.4* 8.9* 9.2* 11.3*    196 230 335   INR  --   --   --  1.1   APTT  --   --   --  24.5       Recent Labs     10/09/22  0430 10/08/22  0300 10/07/22  1740 10/07/22  1115 10/07/22  0217    139  --  139 138   K 4.0 4.2  --  4.2 2.9*   * 109*  --  110* 106   CO2 26 24  --  23 20*   * 115*  --  89 163*   BUN 24* 8  --  11 19   CREA 0.65 0.47*  --  0.64 1.21*   CA 7.8* 8.0*  --  7.5* 8.3*   LAC  --   --  0.8 3.3* 7.4*   ALB 2.6* 3.1*  --   --  3.6   ALT 20 26  --   --  32       Recent Labs     10/07/22  1045 10/07/22  0421   PH 7.42 7.33*   PCO2 31* 38   PO2 268* 163*   HCO3 20* 20*   FIO2 50 30       No results for input(s): CPK, CKNDX, TROIQ in the last 72 hours. No lab exists for component: CPKMB  No results found for: BNPP, BNP   Lab Results   Component Value Date/Time    Culture result: No Growth (<1000 cfu/mL) 10/07/2022 09:20 AM     No results found for: TSH, TSHEXT, TSHEXT     Imaging:    Results from Hospital Encounter encounter on 10/07/22    XR CHEST PORT    Narrative  Clinical history: chf  INDICATION:   chf  COMPARISON: 10/7/2022    FINDINGS:  AP portable upright view of the chest demonstrates a stable  cardiopericardial  silhouette. There is no pleural effusion. .There is no focal consolidation. .There  is no pneumothorax. . Patient is on a cardiac monitor. Removal of ET tube. Impression  No acute process identified.       10/8 extubated yesterday now she is on room air electrolytes normal possible back to the OR soon for second procedure for distal tibia-fibula fracture which is stabilized CT of the leg stable  10/9 alert awake on room air getting pain medication possible wound closure today

## 2022-10-09 NOTE — PROGRESS NOTES
Problem: Mobility Impaired (Adult and Pediatric)  Goal: *Acute Goals and Plan of Care (Insert Text)  Description: FUNCTIONAL STATUS PRIOR TO ADMISSION: Patient was independent and active without use of DME.    HOME SUPPORT PRIOR TO ADMISSION: The patient lived alone with no local support. Physical Therapy Goals  Initiated 10/8/2022  Pt stated goal: to get better  Pt will be I with LE HEP in 7 days. Pt will perform bed mobility with mod I in 7 days. Pt will perform transfers with mod I in 7 days. Pt will amb 25-50 feet with LRAD safely with mod I in 7 days. Outcome: Progressing Towards Goal   PHYSICAL THERAPY TREATMENT  Patient: Misty Howell (37 y.o. female)  Date: 10/9/2022  Diagnosis: Open right ankle fracture [S82.891B] <principal problem not specified>  Procedure(s) (LRB):  IRRIGATION AND DEBRIDEMENT APPLICATION OF EXTERNAL FIXATOR LOWER EXTREMITY (Right) 2 Days Post-Op  Precautions:    Chart, physical therapy assessment, plan of care and goals were reviewed. ASSESSMENT  Patient continues with skilled PT services and is progressing towards goals. Pt received semi supine in bed upon PT arrival, agreeable to session. Pt was lethargic at first. She completed bed mobility, transferred OOB and amb with the FWW, hopping forward, backward, then side stepping to [facility]. (See below for objective details and assist levels). Overall pt tolerated session well today with increased alertness and participation as Tx was progressing. Will continue to benefit from skilled PT services, and will continue to progress as tolerated. Current Level of Function Impacting Discharge (mobility/balance): assist x 1, standing balance deficit    Other factors to consider for discharge: new onset of Dx, WB status, safety, home environment and support available         PLAN :  Patient continues to benefit from skilled intervention to address the above impairments.   Continue treatment per established plan of care to address goals. Recommend with staff: Encourage HEP in prep for ADLs/mobility, Frequent repositioning to prevent skin breakdown, and LE elevation for management of edema    Recommendation for discharge: (in order for the patient to meet his/her long term goals)  Home with [address] [place] [address] [place]    This discharge recommendation:  Has been made in collaboration with the attending provider and/or case management    IF patient discharges home will need the following DME: bedside commode, gait belt, rolling walker, wheelchair, and to be determined (TBD)       SUBJECTIVE:   Patient stated I'm good.     OBJECTIVE DATA SUMMARY:   Critical Behavior:  Neurologic State: Lethargic, Alert (Lethargic at first, then alert)  Orientation Level: Oriented X4  Cognition: Follows commands     Functional Mobility Training:  Bed Mobility:  Rolling: Supervision  Supine to Sit: Supervision  Sit to Supine: Supervision  Scooting: Supervision     Transfers:  Sit to Stand: Contact guard assistance  Stand to Sit: Contact guard assistance     Balance:  Sitting: Intact; With support  Standing: Impaired  Standing - Static: Good;Constant support  Standing - Dynamic : Fair;Constant support    Ambulation/Gait Training:  Distance (ft): 12 Feet (ft)  Assistive Device: Walker, rolling;Gait belt  Ambulation - Level of Assistance: Contact guard assistance;Assist x2  Right Side Weight Bearing: Non-weight bearing    Therapeutic Exercises:       EXERCISE   Sets   Reps   Active Active Assist   Passive Self ROM   Comments   Ankle Pumps 1 20 [x] [] [] [] LLE   Heel Slides 2 10 [x] [] [] []    Long Arc Quads 2 10 [x] [] [] []    Marching 2 10 [x] [] [] []       Pain Ratin/10    Activity Tolerance:   Fair    After treatment patient left in no apparent distress:   Bed locked and returned to lowest position, Supine in bed, Heels elevated for pressure relief, Call bell within reach, Bed / chair alarm activated, and Side rails x 3    COMMUNICATION/COLLABORATION:   The patients plan of care was discussed with: Registered nurse.      Syed Child, PTA, PT   Time Calculation: 27 mins

## 2022-10-09 NOTE — PROGRESS NOTES
1915  Bedside and Verbal shift change report given to Kira (oncoming nurse) by Olivia Berkowitz (offgoing nurse). Report included the following information SBAR, Kardex, Intake/Output, MAR, Recent Results, and Cardiac Rhythm NSR .     2310  Patient complained of 5/10 pain to her lower right extremity. PRN Oxycodone administered. 0700  Bedside and Verbal shift change report given to Son (oncoming nurse) by Tyrel Humphreys (offgoing nurse). Report included the following information SBAR, Kardex, OR Summary, Procedure Summary, Intake/Output, MAR, Recent Results, Cardiac Rhythm NSR, and Alarm Parameters .

## 2022-10-10 ENCOUNTER — ANESTHESIA EVENT (OUTPATIENT)
Dept: SURGERY | Age: 22
DRG: 313 | End: 2022-10-10
Payer: COMMERCIAL

## 2022-10-10 ENCOUNTER — ANESTHESIA (OUTPATIENT)
Dept: SURGERY | Age: 22
DRG: 313 | End: 2022-10-10
Payer: COMMERCIAL

## 2022-10-10 LAB
ALBUMIN SERPL-MCNC: 2.4 G/DL (ref 3.5–5)
ALBUMIN/GLOB SERPL: 1 {RATIO} (ref 1.1–2.2)
ALP SERPL-CCNC: 56 U/L (ref 45–117)
ALT SERPL-CCNC: 29 U/L (ref 12–78)
ANION GAP SERPL CALC-SCNC: 5 MMOL/L (ref 5–15)
AST SERPL W P-5'-P-CCNC: 24 U/L (ref 15–37)
BASOPHILS # BLD: 0 K/UL (ref 0–0.1)
BASOPHILS NFR BLD: 1 % (ref 0–1)
BILIRUB SERPL-MCNC: 0.1 MG/DL (ref 0.2–1)
BUN SERPL-MCNC: 24 MG/DL (ref 6–20)
BUN/CREAT SERPL: 32 (ref 12–20)
CA-I BLD-MCNC: 7.5 MG/DL (ref 8.5–10.1)
CHLORIDE SERPL-SCNC: 110 MMOL/L (ref 97–108)
CO2 SERPL-SCNC: 26 MMOL/L (ref 21–32)
CREAT SERPL-MCNC: 0.74 MG/DL (ref 0.55–1.02)
DIFFERENTIAL METHOD BLD: ABNORMAL
EOSINOPHIL # BLD: 0.1 K/UL (ref 0–0.4)
EOSINOPHIL NFR BLD: 2 % (ref 0–7)
ERYTHROCYTE [DISTWIDTH] IN BLOOD BY AUTOMATED COUNT: 18.1 % (ref 11.5–14.5)
GLOBULIN SER CALC-MCNC: 2.4 G/DL (ref 2–4)
GLUCOSE SERPL-MCNC: 102 MG/DL (ref 65–100)
HCT VFR BLD AUTO: 23.5 % (ref 35–47)
HGB BLD-MCNC: 7.3 G/DL (ref 11.5–16)
IMM GRANULOCYTES # BLD AUTO: 0.1 K/UL (ref 0–0.04)
IMM GRANULOCYTES NFR BLD AUTO: 1 % (ref 0–0.5)
LYMPHOCYTES # BLD: 3.3 K/UL (ref 0.8–3.5)
LYMPHOCYTES NFR BLD: 50 % (ref 12–49)
MCH RBC QN AUTO: 24 PG (ref 26–34)
MCHC RBC AUTO-ENTMCNC: 31.1 G/DL (ref 30–36.5)
MCV RBC AUTO: 77.3 FL (ref 80–99)
MONOCYTES # BLD: 0.3 K/UL (ref 0–1)
MONOCYTES NFR BLD: 5 % (ref 5–13)
NEUTS SEG # BLD: 2.6 K/UL (ref 1.8–8)
NEUTS SEG NFR BLD: 41 % (ref 32–75)
NRBC # BLD: 0 K/UL (ref 0–0.01)
NRBC BLD-RTO: 0 PER 100 WBC
PLATELET # BLD AUTO: 176 K/UL (ref 150–400)
PMV BLD AUTO: 9.5 FL (ref 8.9–12.9)
POTASSIUM SERPL-SCNC: 4.1 MMOL/L (ref 3.5–5.1)
PROT SERPL-MCNC: 4.8 G/DL (ref 6.4–8.2)
RBC # BLD AUTO: 3.04 M/UL (ref 3.8–5.2)
SODIUM SERPL-SCNC: 141 MMOL/L (ref 136–145)
WBC # BLD AUTO: 6.5 K/UL (ref 3.6–11)

## 2022-10-10 PROCEDURE — 0KDS0ZZ EXTRACTION OF RIGHT LOWER LEG MUSCLE, OPEN APPROACH: ICD-10-PCS | Performed by: ORTHOPAEDIC SURGERY

## 2022-10-10 PROCEDURE — 74011250637 HC RX REV CODE- 250/637: Performed by: ORTHOPAEDIC SURGERY

## 2022-10-10 PROCEDURE — 65610000006 HC RM INTENSIVE CARE

## 2022-10-10 PROCEDURE — 74011000250 HC RX REV CODE- 250: Performed by: ORTHOPAEDIC SURGERY

## 2022-10-10 PROCEDURE — 77030013708 HC HNDPC SUC IRR PULS STRY –B: Performed by: ORTHOPAEDIC SURGERY

## 2022-10-10 PROCEDURE — 80053 COMPREHEN METABOLIC PANEL: CPT

## 2022-10-10 PROCEDURE — 77030000032 HC CUF TRNQT ZIMM -B: Performed by: ORTHOPAEDIC SURGERY

## 2022-10-10 PROCEDURE — 74011250636 HC RX REV CODE- 250/636: Performed by: ORTHOPAEDIC SURGERY

## 2022-10-10 PROCEDURE — 74011000250 HC RX REV CODE- 250: Performed by: NURSE PRACTITIONER

## 2022-10-10 PROCEDURE — 74011000258 HC RX REV CODE- 258: Performed by: ORTHOPAEDIC SURGERY

## 2022-10-10 PROCEDURE — 74011000258 HC RX REV CODE- 258: Performed by: INTERNAL MEDICINE

## 2022-10-10 PROCEDURE — 74011250636 HC RX REV CODE- 250/636: Performed by: ANESTHESIOLOGY

## 2022-10-10 PROCEDURE — P9016 RBC LEUKOCYTES REDUCED: HCPCS

## 2022-10-10 PROCEDURE — 76060000033 HC ANESTHESIA 1 TO 1.5 HR: Performed by: ORTHOPAEDIC SURGERY

## 2022-10-10 PROCEDURE — 97116 GAIT TRAINING THERAPY: CPT

## 2022-10-10 PROCEDURE — 85025 COMPLETE CBC W/AUTO DIFF WBC: CPT

## 2022-10-10 PROCEDURE — 74011250636 HC RX REV CODE- 250/636: Performed by: INTERNAL MEDICINE

## 2022-10-10 PROCEDURE — 74011250636 HC RX REV CODE- 250/636: Performed by: NURSE PRACTITIONER

## 2022-10-10 PROCEDURE — 74011250636 HC RX REV CODE- 250/636: Performed by: SURGERY

## 2022-10-10 PROCEDURE — 77030040361 HC SLV COMPR DVT MDII -B: Performed by: ORTHOPAEDIC SURGERY

## 2022-10-10 PROCEDURE — 74011250637 HC RX REV CODE- 250/637: Performed by: PSYCHIATRY & NEUROLOGY

## 2022-10-10 PROCEDURE — 2709999900 HC NON-CHARGEABLE SUPPLY: Performed by: ORTHOPAEDIC SURGERY

## 2022-10-10 PROCEDURE — 76010000149 HC OR TIME 1 TO 1.5 HR: Performed by: ORTHOPAEDIC SURGERY

## 2022-10-10 PROCEDURE — 36430 TRANSFUSION BLD/BLD COMPNT: CPT

## 2022-10-10 PROCEDURE — 36415 COLL VENOUS BLD VENIPUNCTURE: CPT

## 2022-10-10 RX ORDER — SODIUM CHLORIDE 0.9 % (FLUSH) 0.9 %
5-40 SYRINGE (ML) INJECTION EVERY 8 HOURS
Status: DISCONTINUED | OUTPATIENT
Start: 2022-10-10 | End: 2022-10-10

## 2022-10-10 RX ORDER — FENTANYL CITRATE 50 UG/ML
INJECTION, SOLUTION INTRAMUSCULAR; INTRAVENOUS AS NEEDED
Status: DISCONTINUED | OUTPATIENT
Start: 2022-10-10 | End: 2022-10-10 | Stop reason: HOSPADM

## 2022-10-10 RX ORDER — SODIUM CHLORIDE, SODIUM LACTATE, POTASSIUM CHLORIDE, CALCIUM CHLORIDE 600; 310; 30; 20 MG/100ML; MG/100ML; MG/100ML; MG/100ML
INJECTION, SOLUTION INTRAVENOUS
Status: DISCONTINUED | OUTPATIENT
Start: 2022-10-10 | End: 2022-10-10 | Stop reason: HOSPADM

## 2022-10-10 RX ORDER — MIDAZOLAM HYDROCHLORIDE 1 MG/ML
INJECTION, SOLUTION INTRAMUSCULAR; INTRAVENOUS AS NEEDED
Status: DISCONTINUED | OUTPATIENT
Start: 2022-10-10 | End: 2022-10-10 | Stop reason: HOSPADM

## 2022-10-10 RX ORDER — HYDROMORPHONE HYDROCHLORIDE 1 MG/ML
1 INJECTION, SOLUTION INTRAMUSCULAR; INTRAVENOUS; SUBCUTANEOUS
Status: DISCONTINUED | OUTPATIENT
Start: 2022-10-10 | End: 2022-10-10

## 2022-10-10 RX ORDER — LIDOCAINE HYDROCHLORIDE 20 MG/ML
INJECTION, SOLUTION EPIDURAL; INFILTRATION; INTRACAUDAL; PERINEURAL AS NEEDED
Status: DISCONTINUED | OUTPATIENT
Start: 2022-10-10 | End: 2022-10-10 | Stop reason: HOSPADM

## 2022-10-10 RX ORDER — ACETAMINOPHEN 325 MG/1
650 TABLET ORAL EVERY 8 HOURS
Status: DISCONTINUED | OUTPATIENT
Start: 2022-10-10 | End: 2022-10-11

## 2022-10-10 RX ORDER — ONDANSETRON 2 MG/ML
4 INJECTION INTRAMUSCULAR; INTRAVENOUS AS NEEDED
Status: DISCONTINUED | OUTPATIENT
Start: 2022-10-10 | End: 2022-10-13 | Stop reason: HOSPADM

## 2022-10-10 RX ORDER — ONDANSETRON 2 MG/ML
INJECTION INTRAMUSCULAR; INTRAVENOUS AS NEEDED
Status: DISCONTINUED | OUTPATIENT
Start: 2022-10-10 | End: 2022-10-10 | Stop reason: HOSPADM

## 2022-10-10 RX ORDER — EPHEDRINE SULFATE/0.9% NACL/PF 50 MG/5 ML
5 SYRINGE (ML) INTRAVENOUS AS NEEDED
Status: DISCONTINUED | OUTPATIENT
Start: 2022-10-10 | End: 2022-10-10

## 2022-10-10 RX ORDER — NALOXONE HYDROCHLORIDE 0.4 MG/ML
0.4 INJECTION, SOLUTION INTRAMUSCULAR; INTRAVENOUS; SUBCUTANEOUS AS NEEDED
Status: DISCONTINUED | OUTPATIENT
Start: 2022-10-10 | End: 2022-10-13 | Stop reason: HOSPADM

## 2022-10-10 RX ORDER — SODIUM CHLORIDE 0.9 % (FLUSH) 0.9 %
5-40 SYRINGE (ML) INJECTION AS NEEDED
Status: DISCONTINUED | OUTPATIENT
Start: 2022-10-10 | End: 2022-10-11

## 2022-10-10 RX ORDER — OXYCODONE AND ACETAMINOPHEN 5; 325 MG/1; MG/1
1 TABLET ORAL
Status: DISCONTINUED | OUTPATIENT
Start: 2022-10-10 | End: 2022-10-11

## 2022-10-10 RX ORDER — PROPOFOL 10 MG/ML
INJECTION, EMULSION INTRAVENOUS AS NEEDED
Status: DISCONTINUED | OUTPATIENT
Start: 2022-10-10 | End: 2022-10-10 | Stop reason: HOSPADM

## 2022-10-10 RX ORDER — SODIUM CHLORIDE, SODIUM LACTATE, POTASSIUM CHLORIDE, CALCIUM CHLORIDE 600; 310; 30; 20 MG/100ML; MG/100ML; MG/100ML; MG/100ML
100 INJECTION, SOLUTION INTRAVENOUS CONTINUOUS
Status: DISCONTINUED | OUTPATIENT
Start: 2022-10-10 | End: 2022-10-11

## 2022-10-10 RX ORDER — FENTANYL CITRATE 50 UG/ML
25 INJECTION, SOLUTION INTRAMUSCULAR; INTRAVENOUS
Status: DISCONTINUED | OUTPATIENT
Start: 2022-10-10 | End: 2022-10-10

## 2022-10-10 RX ORDER — HYDROMORPHONE HYDROCHLORIDE 1 MG/ML
1 INJECTION, SOLUTION INTRAMUSCULAR; INTRAVENOUS; SUBCUTANEOUS
Status: DISCONTINUED | OUTPATIENT
Start: 2022-10-10 | End: 2022-10-11

## 2022-10-10 RX ORDER — SODIUM CHLORIDE 0.9 % (FLUSH) 0.9 %
5-40 SYRINGE (ML) INJECTION AS NEEDED
Status: DISCONTINUED | OUTPATIENT
Start: 2022-10-10 | End: 2022-10-10

## 2022-10-10 RX ORDER — DEXAMETHASONE SODIUM PHOSPHATE 4 MG/ML
INJECTION, SOLUTION INTRA-ARTICULAR; INTRALESIONAL; INTRAMUSCULAR; INTRAVENOUS; SOFT TISSUE AS NEEDED
Status: DISCONTINUED | OUTPATIENT
Start: 2022-10-10 | End: 2022-10-10 | Stop reason: HOSPADM

## 2022-10-10 RX ORDER — SODIUM CHLORIDE 9 MG/ML
250 INJECTION, SOLUTION INTRAVENOUS AS NEEDED
Status: DISCONTINUED | OUTPATIENT
Start: 2022-10-10 | End: 2022-10-13 | Stop reason: HOSPADM

## 2022-10-10 RX ADMIN — OXYCODONE AND ACETAMINOPHEN 1 TABLET: 5; 325 TABLET ORAL at 20:00

## 2022-10-10 RX ADMIN — OLANZAPINE 5 MG: 10 TABLET, FILM COATED ORAL at 20:00

## 2022-10-10 RX ADMIN — FENTANYL CITRATE 50 MCG: 50 INJECTION, SOLUTION INTRAMUSCULAR; INTRAVENOUS at 16:04

## 2022-10-10 RX ADMIN — DEXAMETHASONE SODIUM PHOSPHATE 4 MG: 4 INJECTION, SOLUTION INTRA-ARTICULAR; INTRALESIONAL; INTRAMUSCULAR; INTRAVENOUS; SOFT TISSUE at 15:53

## 2022-10-10 RX ADMIN — KETOROLAC TROMETHAMINE 15 MG: 15 INJECTION, SOLUTION INTRAMUSCULAR; INTRAVENOUS at 04:46

## 2022-10-10 RX ADMIN — PIPERACILLIN AND TAZOBACTAM 3.38 G: 3; .375 INJECTION, POWDER, FOR SOLUTION INTRAVENOUS at 12:32

## 2022-10-10 RX ADMIN — PIPERACILLIN AND TAZOBACTAM 3.38 G: 3; .375 INJECTION, POWDER, FOR SOLUTION INTRAVENOUS at 20:00

## 2022-10-10 RX ADMIN — KETOROLAC TROMETHAMINE 15 MG: 15 INJECTION, SOLUTION INTRAMUSCULAR; INTRAVENOUS at 18:14

## 2022-10-10 RX ADMIN — SODIUM CHLORIDE, POTASSIUM CHLORIDE, SODIUM LACTATE AND CALCIUM CHLORIDE 100 ML/HR: 600; 310; 30; 20 INJECTION, SOLUTION INTRAVENOUS at 18:14

## 2022-10-10 RX ADMIN — PIPERACILLIN AND TAZOBACTAM 3.38 G: 3; .375 INJECTION, POWDER, FOR SOLUTION INTRAVENOUS at 04:46

## 2022-10-10 RX ADMIN — OLANZAPINE 5 MG: 10 TABLET, FILM COATED ORAL at 09:54

## 2022-10-10 RX ADMIN — PROPOFOL 150 MG: 10 INJECTION, EMULSION INTRAVENOUS at 15:46

## 2022-10-10 RX ADMIN — SODIUM CHLORIDE 75 ML/HR: 9 INJECTION, SOLUTION INTRAVENOUS at 23:23

## 2022-10-10 RX ADMIN — SODIUM CHLORIDE, PRESERVATIVE FREE 10 ML: 5 INJECTION INTRAVENOUS at 04:47

## 2022-10-10 RX ADMIN — KETOROLAC TROMETHAMINE 15 MG: 15 INJECTION, SOLUTION INTRAMUSCULAR; INTRAVENOUS at 23:19

## 2022-10-10 RX ADMIN — SODIUM CHLORIDE, PRESERVATIVE FREE 10 ML: 5 INJECTION INTRAVENOUS at 14:03

## 2022-10-10 RX ADMIN — PROPOFOL 50 MG: 10 INJECTION, EMULSION INTRAVENOUS at 15:47

## 2022-10-10 RX ADMIN — OXYCODONE 5 MG: 5 TABLET ORAL at 14:06

## 2022-10-10 RX ADMIN — ACETAMINOPHEN 650 MG: 325 TABLET ORAL at 20:00

## 2022-10-10 RX ADMIN — ACETAMINOPHEN 650 MG: 325 TABLET ORAL at 18:13

## 2022-10-10 RX ADMIN — OXYCODONE 5 MG: 5 TABLET ORAL at 09:54

## 2022-10-10 RX ADMIN — MIDAZOLAM HYDROCHLORIDE 2 MG: 2 INJECTION, SOLUTION INTRAMUSCULAR; INTRAVENOUS at 15:39

## 2022-10-10 RX ADMIN — SENNOSIDES 8.6 MG: 8.6 TABLET, COATED ORAL at 09:54

## 2022-10-10 RX ADMIN — ONDANSETRON 4 MG: 2 INJECTION INTRAMUSCULAR; INTRAVENOUS at 15:53

## 2022-10-10 RX ADMIN — KETOROLAC TROMETHAMINE 15 MG: 15 INJECTION, SOLUTION INTRAMUSCULAR; INTRAVENOUS at 12:32

## 2022-10-10 RX ADMIN — FENTANYL CITRATE 50 MCG: 50 INJECTION, SOLUTION INTRAMUSCULAR; INTRAVENOUS at 15:44

## 2022-10-10 RX ADMIN — SODIUM CHLORIDE, POTASSIUM CHLORIDE, SODIUM LACTATE AND CALCIUM CHLORIDE: 600; 310; 30; 20 INJECTION, SOLUTION INTRAVENOUS at 15:39

## 2022-10-10 RX ADMIN — HYDROMORPHONE HYDROCHLORIDE 1 MG: 1 INJECTION, SOLUTION INTRAMUSCULAR; INTRAVENOUS; SUBCUTANEOUS at 17:28

## 2022-10-10 RX ADMIN — SODIUM CHLORIDE, PRESERVATIVE FREE 10 ML: 5 INJECTION INTRAVENOUS at 20:00

## 2022-10-10 RX ADMIN — LIDOCAINE HYDROCHLORIDE 80 MG: 20 INJECTION, SOLUTION EPIDURAL; INFILTRATION; INTRACAUDAL; PERINEURAL at 15:44

## 2022-10-10 NOTE — PROGRESS NOTES
Orthopedic progress note    Date:10/10/2022       Room:[facility]  Patient Kim Corea     Date of Birth:[birth year]     Age:21 y.o. Subjective    19-year-old female status post I&D with wound VAC application of medial wound and application of external fixator right ankle. Postop day #3. Patient is lying comfortably in bed. She still remains in ICU. No other complaints at this time. Objective           Vitals Last 24 Hours:  TEMPERATURE:  Temp  Av.4 °F (36.9 °C)  Min: 98.1 °F (36.7 °C)  Max: 98.6 °F (37 °C)  RESPIRATIONS RANGE: Resp  Av.4  Min: 16  Max: 26  PULSE OXIMETRY RANGE: SpO2  Av.8 %  Min: 97 %  Max: 100 %  PULSE RANGE: Pulse  Av  Min: 78  Max: 110  BLOOD PRESSURE RANGE: Systolic (93DQX), UOF:358 , Min:109 , RMC:874   ; Diastolic (84QYW), LPJ:32, Min:58, Max:93    Current Facility-Administered Medications   Medication Dose Route Frequency    0.9% sodium chloride infusion 250 mL  250 mL IntraVENous PRN    0.9% sodium chloride infusion  75 mL/hr IntraVENous CONTINUOUS    sodium chloride (NS) flush 5-40 mL  5-40 mL IntraVENous Q8H    sodium chloride (NS) flush 5-40 mL  5-40 mL IntraVENous PRN    [Held by provider] enoxaparin (LOVENOX) injection 30 mg  30 mg SubCUTAneous Q24H    piperacillin-tazobactam (ZOSYN) 3.375 g in 0.9% sodium chloride (MBP/ADV) 100 mL MBP  3.375 g IntraVENous Q8H    ketorolac (TORADOL) injection 15 mg  15 mg IntraVENous Q6H    OLANZapine (ZyPREXA) tablet 5 mg  5 mg Oral Q12H    OLANZapine (ZyPREXA) injection 10 mg  10 mg IntraMUSCular Q8H PRN    morphine injection 4 mg  4 mg IntraVENous Q4H PRN    LORazepam (ATIVAN) injection 1 mg  1 mg IntraVENous Q4H PRN    oxyCODONE IR (ROXICODONE) tablet 5 mg  5 mg Oral Q4H PRN    polyethylene glycol (MIRALAX) packet 17 g  17 g Oral DAILY    docusate sodium (COLACE) capsule 100 mg  100 mg Oral BID PRN    senna (SENOKOT) tablet 8.6 mg  1 Tablet Oral DAILY          I/O (24Hr):     Intake/Output Summary (Last 24 hours) at 10/10/2022 1419  Last data filed at 10/10/2022 1408  Gross per 24 hour   Intake 2100 ml   Output 3175 ml   Net -1075 ml     Objective:  Vital signs: (most recent): Blood pressure 127/86, pulse 90, temperature 98.3 °F (36.8 °C), resp. rate 19, height 5' 7\" (1.702 m), weight 77.1 kg (170 lb), SpO2 99 %, not currently breastfeeding. Labs/Imaging/Diagnostics    Labs:  CBC:  Recent Labs     10/10/22  0340 10/09/22  0430 10/08/22  0300   WBC 6.5 9.0 6.0   RBC 3.04* 3.14* 3.73*   HGB 7.3* 7.4* 8.9*   HCT 23.5* 24.1* 28.1*   MCV 77.3* 76.8* 75.3*   RDW 18.1* 17.9* 17.4*    175 196     CHEMISTRIES:  Recent Labs     10/10/22  0340 10/09/22  0430 10/08/22  0300    144 139   K 4.1 4.0 4.2   * 113* 109*   CO2 26 26 24   BUN 24* 24* 8   CREA 0.74 0.65 0.47*   CA 7.5* 7.8* 8.0*   PT/INR:No results for input(s): INR, INREXT in the last 72 hours. No lab exists for component: PROTIME  APTT:No results for input(s): APTT in the last 72 hours. LIVER PROFILE:  Recent Labs     10/10/22  0340 10/09/22  0430 10/08/22  0300   AST 24 17 31   ALT 29 20 26     Lab Results   Component Value Date/Time    ALT (SGPT) 29 10/10/2022 03:40 AM    AST (SGOT) 24 10/10/2022 03:40 AM    Alk. phosphatase 56 10/10/2022 03:40 AM    Bilirubin, total 0.1 (L) 10/10/2022 03:40 AM             Assessment//Plan           Patient Active Problem List    Diagnosis Date Noted    Open right ankle fracture 10/07/2022     Status post I&D with wound VAC application and external fixator application right ankle. Postop day #3. Discussed patient with Dr. Dayron Araiza. We will plan for repeat I&D and possible wound closure of right ankle. I explained the procedure to the patient and her family. I also explained to them that if we are unable to close the ankle wound we will need to reapply the wound VAC. Final fixation of her ankle fracture will not occur for 2 to 3 weeks. Patient remained stable she can be transferred to medicine floor.   Anticipate patient be ready for discharge by tomorrow.       Electronically signed by Meredith Lux PA-C on 10/10/2022 at 2:19 PM

## 2022-10-10 NOTE — PROGRESS NOTES
[address] [facility] SURGERY PROGRESS NOTE           Chief Complaint: non    History of Present Illness:    Mr/MsJanneth Conley is a 24y.o. year old * female presents to ER after a fall and sustained right ankle fracture. She also was presented with altered mental status and had to be intubated. Underwent right ankle ORIF with wound vac placement. Admitted to Trauma service for further management. Extubated & tolerating diet. Worked with PT today. Had low urine ouput. Hemoglobin 7.3        Past Medical History: History reviewed. No pertinent past medical history. Past Surgical History: History reviewed. No pertinent surgical history. Allergy:No Known Allergies    Social History:  reports current alcohol use. She reports current drug use. Frequency: 7.00 times per week. Drug: Marijuana.      Family History:  Family History   Problem Relation Age of Onset    Psychotic Disorder Maternal Uncle           Current Medications:  Current Facility-Administered Medications:     0.9% sodium chloride infusion, 75 mL/hr, IntraVENous, CONTINUOUS, Keli Serra MD, Last Rate: 75 mL/hr at 10/09/22 1838, 75 mL/hr at 10/09/22 1838    sodium chloride (NS) flush 5-40 mL, 5-40 mL, IntraVENous, Q8H, Magnolia Fernandez MD, 10 mL at 10/10/22 0447    sodium chloride (NS) flush 5-40 mL, 5-40 mL, IntraVENous, PRN, Magnolia Fernandez MD    enoxaparin (LOVENOX) injection 30 mg, 30 mg, SubCUTAneous, Q24H, Magnolia Fernandez MD    piperacillin-tazobactam (ZOSYN) 3.375 g in 0.9% sodium chloride (MBP/ADV) 100 mL MBP, 3.375 g, IntraVENous, Q8H, Michoacano Giraldo MD, Last Rate: 25 mL/hr at 10/10/22 0446, 3.375 g at 10/10/22 0446    ketorolac (TORADOL) injection 15 mg, 15 mg, IntraVENous, Q6H, Keli Serra MD, 15 mg at 10/10/22 0446    OLANZapine (ZyPREXA) tablet 5 mg, 5 mg, Oral, Q12H, Cristi Davis MD, 5 mg at 10/10/22 0954    OLANZapine (ZyPREXA) injection 10 mg, 10 mg, IntraMUSCular, Q8H PRN, Zeferino Granados MD morphine injection 4 mg, 4 mg, IntraVENous, Q4H PRN, Keli Serra MD, 4 mg at 10/07/22 1522    LORazepam (ATIVAN) injection 1 mg, 1 mg, IntraVENous, Q4H PRN, Michoacano Giraldo MD    oxyCODONE IR (ROXICODONE) tablet 5 mg, 5 mg, Oral, Q4H PRN, Yayo HERNANDEZ MD, 5 mg at 10/10/22 0954    polyethylene glycol (MIRALAX) packet 17 g, 17 g, Oral, DAILY, Malika Cornelius MD, 17 g at 10/09/22 5347    docusate sodium (COLACE) capsule 100 mg, 100 mg, Oral, BID PRN, Leonarda Cullen MD    senna (SENOKOT) tablet 8.6 mg, 1 Tablet, Oral, DAILY, Yayo HERNANDEZ MD, 8.6 mg at 10/10/22 0865     Immunization History:   Most Recent Immunizations   Administered Date(s) Administered    Td, Adsorbed 10/07/2022      Complete    Review of Systems:     Constitutional:  no fever,  no chills,  no sweats, No weakness, No fatigue, No decreased activity. Eye: No recent visual problem, No icterus, No discharge, No double vision. Ear/Nose/Mouth/Throat: No decreased hearing, No ear pain, No nasal congestion, No sore throat. Respiratory: No shortness of breath, No cough, No sputum production, No hemoptysis, No wheezing, No cyanosis. Cardiovascular: No chest pain, No palpitations, No bradycardia, No tachycardia, No peripheral edema, No syncope. Gastrointestinal: No nausea,  No vomiting, No diarrhea, No constipation, No heartburn,  No abdominal pain. Genitourinary: No dysuria, No hematuria, No change in urine stream, No urethral discharge, No lesions. Hematology/Lymphatics: No bruising tendency, No bleeding tendency, No petechiae, No swollen lymph glands. Endocrine: No excessive thirst, No polyuria, No cold intolerance, No heat intolerance, No excessive hunger. Immunologic: Not immunocompromised, No recurrent fevers, No recurrent infections. Musculoskeletal: No back pain, No neck pain, No joint pain, No muscle pain, No claudication, No decreased range of motion, No trauma.   Integumentary: No rash, No pruritus, No abrasions. Neurologic: Alert and oriented X4, No abnormal balance, No headache, No confusion, No numbness, No tingling. Psychiatric: No anxiety, No depression, No dickson. Physical Exam:     Vitals & Measurements: Wt Readings from Last 3 Encounters:   10/07/22 77.1 kg (170 lb)     Temp Readings from Last 3 Encounters:   10/10/22 98.3 °F (36.8 °C)     BP Readings from Last 3 Encounters:   10/10/22 127/86     Pulse Readings from Last 3 Encounters:   10/10/22 90      Ht Readings from Last 3 Encounters:   10/07/22 5' 7\" (1.702 m)          General: well appearing, no acute distress  Head: Normal  Face: Nornal  HEENT: atraumatic, PERRLA, moist mucosa, normal pharynx, normal tonsils and adenoids, normal tongue, no fluid in sinuses  Neck: Trachea midline, no carotid bruit, no masses  Chest: Normal.  Respiratory: Normal chest wall expansion, CTA B, no r/r/w, no rubs  Cardiovascular: RRR, no m/r/g, Normal S1 and S2  Abdomen: Soft, non tender, non-distended, normal bowel sounds in all quadrants, no hepatosplenomegaly, no tympany. Incision scar:   Genitourinary: No inguinal hernia, normal external gentalia, no renal angle tenderness, george+  Rectal: deferred  Musculoskeletal: normal ROM in upper and lower extremities, No joint swelling.  Right ankle external fixator with wound vac+Pulse 2+  Integumentary: Warm, dry, and pink, with no rash, purpura, or petechia  Heme/Lymph: No lymphadenopathy, no bruises  Neurological:Cranial Nerves II-XII grossly intact, no gross motor or sensory deficit  Psychiatric: Cooperative with normal mood, affect, and cognition      Laboratory Values:   Recent Results (from the past 24 hour(s))   CBC WITH AUTOMATED DIFF    Collection Time: 10/10/22  3:40 AM   Result Value Ref Range    WBC 6.5 3.6 - 11.0 K/uL    RBC 3.04 (L) 3.80 - 5.20 M/uL    HGB 7.3 (L) 11.5 - 16.0 g/dL    HCT 23.5 (L) 35.0 - 47.0 %    MCV 77.3 (L) 80.0 - 99.0 FL    MCH 24.0 (L) 26.0 - 34.0 PG    MCHC 31.1 30.0 - 36.5 g/dL    RDW 18.1 (H) 11.5 - 14.5 %    PLATELET 113 758 - 032 K/uL    MPV 9.5 8.9 - 12.9 FL    NRBC 0.0 0.0  WBC    ABSOLUTE NRBC 0.00 0.00 - 0.01 K/uL    NEUTROPHILS 41 32 - 75 %    LYMPHOCYTES 50 (H) 12 - 49 %    MONOCYTES 5 5 - 13 %    EOSINOPHILS 2 0 - 7 %    BASOPHILS 1 0 - 1 %    IMMATURE GRANULOCYTES 1 (H) 0 - 0.5 %    ABS. NEUTROPHILS 2.6 1.8 - 8.0 K/UL    ABS. LYMPHOCYTES 3.3 0.8 - 3.5 K/UL    ABS. MONOCYTES 0.3 0.0 - 1.0 K/UL    ABS. EOSINOPHILS 0.1 0.0 - 0.4 K/UL    ABS. BASOPHILS 0.0 0.0 - 0.1 K/UL    ABS. IMM. GRANS. 0.1 (H) 0.00 - 0.04 K/UL    DF AUTOMATED     METABOLIC PANEL, COMPREHENSIVE    Collection Time: 10/10/22  3:40 AM   Result Value Ref Range    Sodium 141 136 - 145 mmol/L    Potassium 4.1 3.5 - 5.1 mmol/L    Chloride 110 (H) 97 - 108 mmol/L    CO2 26 21 - 32 mmol/L    Anion gap 5 5 - 15 mmol/L    Glucose 102 (H) 65 - 100 mg/dL    BUN 24 (H) 6 - 20 mg/dL    Creatinine 0.74 0.55 - 1.02 mg/dL    BUN/Creatinine ratio 32 (H) 12 - 20      eGFR >60 >60 ml/min/1.73m2    Calcium 7.5 (L) 8.5 - 10.1 mg/dL    Bilirubin, total 0.1 (L) 0.2 - 1.0 mg/dL    AST (SGOT) 24 15 - 37 U/L    ALT (SGPT) 29 12 - 78 U/L    Alk. phosphatase 56 45 - 117 U/L    Protein, total 4.8 (L) 6.4 - 8.2 g/dL    Albumin 2.4 (L) 3.5 - 5.0 g/dL    Globulin 2.4 2.0 - 4.0 g/dL    A-G Ratio 1.0 (L) 1.1 - 2.2           XR CHEST PORT   Final Result   No acute process identified. CT TIB/FIB RT WO CONT   Final Result   External fixation of the lower leg. Internal fixation of pilon fracture. Distal   fibular fracture. XR CHEST PORT   Final Result   1. Lungs are hypoventilatory but grossly clear. XR FLUOROSCOPY UNDER 60 MINUTES   Final Result   Portable fluoroscopy. INTERPRETATION PROVIDED FOR COMPLIANCE ONLY AT NO CHARGE                         XR CHEST PORT   Final Result      Satisfactory endotracheal tube placement. Stable low lung volumes with bibasilar   atelectasis.          CT HEAD WO CONT   Final Result      No acute intracranial abnormality. CT SPINE CERV WO CONT   Final Result   No acute abnormality. XR CHEST PORT   Final Result      Satisfactory endotracheal tube placement. Low lung volumes with bibasilar   atelectasis. XR ANKLE RT AP/LAT   Final Result   Acute displaced fracture articular fractures of the distal tibia and   fibula. Soft tissue gas. Assessment:  S/P fall  Right ankle fracture  S/P External fixation with wound vac by Ortho         Plan:    Admission  Diet: NPO today. Increase IV fluids  SCD  IS  Pain medications  Antibiotics  Nausea medication  Sarabia  Labs in am  Consult: Ortho/Pulmonary & Psche  To OR today by Ortho. Transfuse 1 unit of pRBC. Transfer to Ortho service, no General surgery issues. Thank you     Thank you for the consultation & allowing me to participate in the care of this patient.

## 2022-10-10 NOTE — PROGRESS NOTES
1900  Bedside and Verbal shift change report given to Kira (oncoming nurse) by Rajani Garrett (offgoing nurse). Report included the following information SBAR, Kardex, Intake/Output, MAR, Recent Results, and Cardiac Rhythm NSR .    2000  Patient complained of 5/10 pain to her right lower leg. PRN Percocet administered. 5542  Patient complained of 8/10 pain to her right lower leg. PRN Dilaudid administered. 0700  Bedside and Verbal shift change report given to Rajani Garrett (oncoming nurse) by Bessie Landin (offgoing nurse). Report included the following information SBAR, Kardex, Intake/Output, MAR, Recent Results, and Cardiac Rhythm NSR .

## 2022-10-10 NOTE — WOUND CARE
Wound Care Note:      Wound Care into see patient because of wound vac to right leg with ORIF    Patient had surgery with orthopedics on 10/7/22, placement of ORIF and wound vac applied in the OR. Wound vac intact, no alarms or leaks at this time. Wound vac due to be changed 10/11/22, WCN to change tomorrow. Patient possibly going back to OR for wound closure? WCN to continue to follow. Skin Care & Pressure Relief Recommendations:  Minimize layers of linen  Pads under patient to optimize support surface and microclimate  Turn/Reposition approximately every 2 hours  Pillow/Wedge  Manage Incontinence  Promote Continence; Skin Protective lotion to buttocks and sacrum daily and as needed with incontinence care  Offload heels with pillows or offloading boots    Reinaldo Mcneal RN, BSN  [facility] [place]  10/10/22  10:35 AM    Negative Pressure    If wound vac is not working properly: 1) Check to see if track pad tubing is clogged. If so, remove track pad, apply film over exposed foam, cut dime size hole in film, and apply a new track pad. 2) If film is leaking, find area with leak and reinforce with film. 3) If leak cannot be fixed, remove dressing, apply wet/dry dressing, and inform the WCN.

## 2022-10-10 NOTE — OP NOTES
Operative Note    Patient: Villa Conley  YOB: 2000  MRN: [id number]    Date of Procedure: 10/10/2022     Pre-Op Diagnosis: S/P Staged Right Ankle Fracture Open I&D and External Fixation with wound vac application     Post-Op Diagnosis: Same as preoperative diagnosis. Procedure(s):  Irrigation And Debridement Of Right Ankle to bone medial wound and Secondary Wound Closure    Surgeon(s):  Sonu Case MD    Surgical Assistant: None    Anesthesia: General     Estimated Blood Loss (mL):  Minimal    Complications: None    Specimens: * No specimens in log *     Implants: * No implants in log *    Drains: * No LDAs found *    Findings: Open wound on the medial side of the distal tibia with exposed bone L-shaped partially closed on the distal part with a wound VAC in position no further necrosis, no infection seen    Detailed Description of Procedure:   Patient brought to the operating table patient is on scheduled antibiotics. Patient was also getting 1 unit of blood. Patient was brought from the ICU to the OR. General anesthesia induced maintained by anesthesiologist.  Right leg was prepped and draped in standard sterile fashion. The pictures of the wound were taken and placed in the chart. After the prep and drape timeout was carried out. The wound debridement was immediately initiated. Edge debridement of some mild superficial epidermolysis was carried out. The deep layers of subcutaneous fat were healthy with bleeding tissue no necrosis seen. The periosteum covering the distal tibia was also healthy with bleeding. Pulse lavage was carried out with 3 L of saline. Once this was completed the edges debridement was carried out followed by closure with 2 layers subcutaneous 2-0 PDS suture in figure-of-eight fashion was used followed by near far near 3-0 nylon sutures on the skin.   Dressing was applied with bacitracin Xeroform 4 x 4's ABDs cast padding and a short leg posterior splint was applied to hold the foot out of equinus. Patient tolerated procedure well was transferred to recovery in stable condition.     Electronically Signed by Dorian Whiting MD on 10/10/2022 at 4:57 PM

## 2022-10-10 NOTE — PROGRESS NOTES
IMPRESSION:   Acute hypoxic respiratory failure resolved  Displaced open fracture of the right distal tibia and fibula  EtOH and polysubstance abuse  Status post open fracture right ankle irrigation and debridement application of external fixator  Hypokalemia  Anemia hemoglobin 7.4  Additional workup outlined below  Pt is at high risk of sudden decline and decompensation with life threatening consequenses and continued end organ dysfunction and failure  Pt is critically ill. Time spent with pt and staff actively rendering care, managing pt and coordinating care as stated below; 30 minutes, exclusive of any procedures      RECOMMENDATIONS/PLAN:   ICU monitoring  Extubated on 10/7  now on room air  Potassium normal  Transfuse prn to maintain Hgb > 7  Labs to follow electrolytes, renal function and and blood counts  Bronchial hygiene with respiratory therapy techniques, bronchodilators  Pt needs IV fluids with additives and Drug therapy requiring intensive monitoring for toxicity  Prescription drug management with home med reconciliation reviewed  DVT, SUP prophylaxis  Will be available to assist in medical management while in the CCU pending disposition     [x] High complexity decision making was performed  [x] See my orders for details  HPI  80-year-old lady 4 weeks post partum came to the ED via EMS after jumping off a pillar outside of a restaurant and sustaining a open tib/fib fracture. The mother states that the patient has always been slightly off but since delivering her child has become increasingly different. The mother states that she does not speak with the family, recently sold all of her possessions, and moved into and air [facility] although the mother states that she had the option to live with multiple family members. The mother states that she has a relative diagnosed with schizoaffective disorder and a another relative has bipolar schizophrenia.  The patient was combative in the ED and leading up to the surgery at which point she was sedated and intubated. PMH:  has no past medical history on file. PSH:   has no past surgical history on file. FHX: family history includes Psychotic Disorder in her maternal uncle. SHX:  reports current alcohol use. She reports current drug use. Frequency: 7.00 times per week. Drug: Marijuana. ALL: No Known Allergies     MEDS:   [x] Reviewed - As Below   [] Not reviewed    Current Facility-Administered Medications   Medication    0.9% sodium chloride infusion    sodium chloride (NS) flush 5-40 mL    sodium chloride (NS) flush 5-40 mL    enoxaparin (LOVENOX) injection 30 mg    piperacillin-tazobactam (ZOSYN) 3.375 g in 0.9% sodium chloride (MBP/ADV) 100 mL MBP    ketorolac (TORADOL) injection 15 mg    OLANZapine (ZyPREXA) tablet 5 mg    OLANZapine (ZyPREXA) injection 10 mg    morphine injection 4 mg    LORazepam (ATIVAN) injection 1 mg    oxyCODONE IR (ROXICODONE) tablet 5 mg    polyethylene glycol (MIRALAX) packet 17 g    docusate sodium (COLACE) capsule 100 mg    senna (SENOKOT) tablet 8.6 mg      MAR reviewed and pertinent medications noted or modified as needed   Current Facility-Administered Medications   Medication    0.9% sodium chloride infusion    sodium chloride (NS) flush 5-40 mL    sodium chloride (NS) flush 5-40 mL    enoxaparin (LOVENOX) injection 30 mg    piperacillin-tazobactam (ZOSYN) 3.375 g in 0.9% sodium chloride (MBP/ADV) 100 mL MBP    ketorolac (TORADOL) injection 15 mg    OLANZapine (ZyPREXA) tablet 5 mg    OLANZapine (ZyPREXA) injection 10 mg    morphine injection 4 mg    LORazepam (ATIVAN) injection 1 mg    oxyCODONE IR (ROXICODONE) tablet 5 mg    polyethylene glycol (MIRALAX) packet 17 g    docusate sodium (COLACE) capsule 100 mg    senna (SENOKOT) tablet 8.6 mg      PMH:  has no past medical history on file. PSH:   has no past surgical history on file. FHX: family history includes Psychotic Disorder in her maternal uncle.    SHX:  reports current alcohol use. She reports current drug use. Frequency: 7.00 times per week. Drug: Marijuana. ROS:  Review of system done as mentioned in the HPI and physical exam    Hemodynamics:    CO:    CI:    CVP:    SVR:   PAP Systolic:    PAP Diastolic:    PVR:    FW62:        Ventilator Settings:      Mode Rate TV Press PEEP FiO2 PIP Min. Vent   Assist control, Volume control    460 ml    5 cm H20 35 %  19 cm H2O  7.8 l/min        Vital Signs: Telemetry:    normal sinus rhythm Intake/Output:   Visit Vitals  /81   Pulse 79   Temp 98.3 °F (36.8 °C)   Resp 19   Ht 5' 7\" (1.702 m)   Wt 77.1 kg (170 lb)   SpO2 98%   Breastfeeding No Comment: Mother states not breastfeeding at this time   BMI 26.63 kg/m²       Temp (24hrs), Av.4 °F (36.9 °C), Min:98.1 °F (36.7 °C), Max:98.6 °F (37 °C)        O2 Device: None (Room air) O2 Flow Rate (L/min): 2 l/min       Wt Readings from Last 4 Encounters:   10/07/22 77.1 kg (170 lb)          Intake/Output Summary (Last 24 hours) at 10/10/2022 0909  Last data filed at 10/10/2022 0450  Gross per 24 hour   Intake 2100 ml   Output 1025 ml   Net 1075 ml         Last shift:      No intake/output data recorded. Last 3 shifts: 10/08 1901 - 10/10 0700  In: 4948.8 [P.O.:1440; I.V.:3508.8]  Out: 1390 [Urine:1290; Drains:100]       Physical Exam:     General: Alert awake on room air  HEENT: NCAT, poor dentition, lips and mucosa dry  Eyes: anicteric; conjunctiva clear  Neck: no nodes, trach midline; no accessory MM use.   Chest: no deformity,   Cardiac: IR regular; no murmur;   Lungs: distant breath sounds; no wheezes  Abd: soft, NT, hypoactive BS  Ext: Right lower extremity cast with external fixator rods in place  : NO george, clear urine  Neuro: sedated with fentanyl and propofol  Psych- unable to assess  Skin: warm, dry, no cyanosis;   Pulses: 1-2+ Bilateral pedal, radial  Capillary: brisk; pale      DATA:    MAR reviewed and pertinent medications noted or modified as needed  MEDS: Current Facility-Administered Medications   Medication    0.9% sodium chloride infusion    sodium chloride (NS) flush 5-40 mL    sodium chloride (NS) flush 5-40 mL    enoxaparin (LOVENOX) injection 30 mg    piperacillin-tazobactam (ZOSYN) 3.375 g in 0.9% sodium chloride (MBP/ADV) 100 mL MBP    ketorolac (TORADOL) injection 15 mg    OLANZapine (ZyPREXA) tablet 5 mg    OLANZapine (ZyPREXA) injection 10 mg    morphine injection 4 mg    LORazepam (ATIVAN) injection 1 mg    oxyCODONE IR (ROXICODONE) tablet 5 mg    polyethylene glycol (MIRALAX) packet 17 g    docusate sodium (COLACE) capsule 100 mg    senna (SENOKOT) tablet 8.6 mg        Labs:    Recent Labs     10/10/22  0340 10/09/22  0430 10/08/22  0300   WBC 6.5 9.0 6.0   HGB 7.3* 7.4* 8.9*    175 196       Recent Labs     10/10/22  0340 10/09/22  0430 10/08/22  0300 10/07/22  1740 10/07/22  1115    144 139  --  139   K 4.1 4.0 4.2  --  4.2   * 113* 109*  --  110*   CO2 26 26 24  --  23   * 110* 115*  --  89   BUN 24* 24* 8  --  11   CREA 0.74 0.65 0.47*  --  0.64   CA 7.5* 7.8* 8.0*  --  7.5*   LAC  --   --   --  0.8 3.3*   ALB 2.4* 2.6* 3.1*  --   --    ALT 29 20 26  --   --        Recent Labs     10/07/22  1045   PH 7.42   PCO2 31*   PO2 268*   HCO3 20*   FIO2 50       No results for input(s): CPK, CKNDX, TROIQ in the last 72 hours. No lab exists for component: CPKMB  No results found for: BNPP, BNP   Lab Results   Component Value Date/Time    Culture result: No Growth (<1000 cfu/mL) 10/07/2022 09:20 AM     No results found for: TSH, TSHEXT, TSHEXT     Imaging:    Results from Hospital Encounter encounter on 10/07/22    XR CHEST PORT    Narrative  Clinical history: chf  INDICATION:   chf  COMPARISON: 10/7/2022    FINDINGS:  AP portable upright view of the chest demonstrates a stable  cardiopericardial  silhouette. There is no pleural effusion. .There is no focal consolidation. .There  is no pneumothorax. . Patient is on a cardiac monitor. Removal of ET tube. Impression  No acute process identified.       10/8 extubated yesterday now she is on room air electrolytes normal possible back to the OR soon for second procedure for distal tibia-fibula fracture which is stabilized CT of the leg stable  10/9 alert awake on room air getting pain medication possible wound closure today  10/10 awaiting for second part of the surgery

## 2022-10-10 NOTE — PROGRESS NOTES
Problem: Falls - Risk of  Goal: *Absence of Falls  Description: Document Finesse Garcia Fall Risk and appropriate interventions in the flowsheet.   Outcome: Progressing Towards Goal  Note: Fall Risk Interventions:  Mobility Interventions: Bed/chair exit alarm, Communicate number of staff needed for ambulation/transfer, Patient to call before getting OOB, PT Consult for mobility concerns, PT Consult for assist device competence, Strengthening exercises (ROM-active/passive), Utilize walker, cane, or other assistive device    Mentation Interventions: Adequate sleep, hydration, pain control, Bed/chair exit alarm, Door open when patient unattended, Update white board, Toileting rounds, Room close to nurse's station, Reorient patient, More frequent rounding, Increase mobility, Familiar objects from home    Medication Interventions: Bed/chair exit alarm, Evaluate medications/consider consulting pharmacy, Patient to call before getting OOB, Teach patient to arise slowly    Elimination Interventions: Bed/chair exit alarm, Toileting schedule/hourly rounds, Toilet paper/wipes in reach, Patient to call for help with toileting needs, Call light in reach, Stay With Me (per policy)    History of Falls Interventions: Bed/chair exit alarm, Room close to nurse's station, Door open when patient unattended         Problem: Patient Education: Go to Patient Education Activity  Goal: Patient/Family Education  Outcome: Progressing Towards Goal     Problem: Pain  Goal: *Control of Pain  Outcome: Progressing Towards Goal

## 2022-10-10 NOTE — PROGRESS NOTES
Patient return from OR to ICU bed 283. MD and anesthesia at bedside. VSS, pt in no distress at this time.

## 2022-10-10 NOTE — ANESTHESIA PREPROCEDURE EVALUATION
Relevant Problems   No relevant active problems       Anesthetic History   No history of anesthetic complications            Review of Systems / Medical History  Patient summary reviewed, nursing notes reviewed and pertinent labs reviewed    Pulmonary          Smoker      Comments: Smokes marijuana daily. Neuro/Psych         Headaches and psychiatric history     Cardiovascular  Within defined limits                Exercise tolerance: >4 METS: SVT on admission to the ED. Hypokalemia. Comments: alcohol   GI/Hepatic/Renal  Within defined limits              Endo/Other  Within defined limits           Other Findings          No past medical history on file. No past surgical history on file. Current Outpatient Medications   Medication Instructions    multivitamin (ONE A DAY) tablet 1 Tablet, Oral, DAILY       No current facility-administered medications for this visit. No current outpatient medications on file.      Facility-Administered Medications Ordered in Other Visits   Medication Dose Route Frequency    0.9% sodium chloride infusion 250 mL  250 mL IntraVENous PRN    0.9% sodium chloride infusion  75 mL/hr IntraVENous CONTINUOUS    sodium chloride (NS) flush 5-40 mL  5-40 mL IntraVENous Q8H    sodium chloride (NS) flush 5-40 mL  5-40 mL IntraVENous PRN    [Held by provider] enoxaparin (LOVENOX) injection 30 mg  30 mg SubCUTAneous Q24H    piperacillin-tazobactam (ZOSYN) 3.375 g in 0.9% sodium chloride (MBP/ADV) 100 mL MBP  3.375 g IntraVENous Q8H    ketorolac (TORADOL) injection 15 mg  15 mg IntraVENous Q6H    OLANZapine (ZyPREXA) tablet 5 mg  5 mg Oral Q12H    OLANZapine (ZyPREXA) injection 10 mg  10 mg IntraMUSCular Q8H PRN    morphine injection 4 mg  4 mg IntraVENous Q4H PRN    LORazepam (ATIVAN) injection 1 mg  1 mg IntraVENous Q4H PRN    oxyCODONE IR (ROXICODONE) tablet 5 mg  5 mg Oral Q4H PRN    polyethylene glycol (MIRALAX) packet 17 g  17 g Oral DAILY    docusate sodium (COLACE) capsule 100 mg  100 mg Oral BID PRN    senna (SENOKOT) tablet 8.6 mg  1 Tablet Oral DAILY       No data found. Lab Results   Component Value Date/Time    WBC 6.5 10/10/2022 03:40 AM    HGB 7.3 (L) 10/10/2022 03:40 AM    HCT 23.5 (L) 10/10/2022 03:40 AM    PLATELET 319 55/70/2387 03:40 AM    MCV 77.3 (L) 10/10/2022 03:40 AM     Lab Results   Component Value Date/Time    Sodium 141 10/10/2022 03:40 AM    Potassium 4.1 10/10/2022 03:40 AM    Chloride 110 (H) 10/10/2022 03:40 AM    CO2 26 10/10/2022 03:40 AM    Anion gap 5 10/10/2022 03:40 AM    Glucose 102 (H) 10/10/2022 03:40 AM    BUN 24 (H) 10/10/2022 03:40 AM    Creatinine 0.74 10/10/2022 03:40 AM    BUN/Creatinine ratio 32 (H) 10/10/2022 03:40 AM    Calcium 7.5 (L) 10/10/2022 03:40 AM     No results found for: APTT, PTP, INR, INREXT, INREXT  Lab Results   Component Value Date/Time    Glucose 102 (H) 10/10/2022 03:40 AM     Physical Exam    Airway  Mallampati: II  TM Distance: > 6 cm  Neck ROM: normal range of motion   Mouth opening: Normal     Cardiovascular  Regular rate and rhythm,  S1 and S2 normal,  no murmur, click, rub, or gallop  Rhythm: regular  Rate: normal        Comments: Hypokalemia. Dental  No notable dental hx       Pulmonary  Breath sounds clear to auscultation               Abdominal  GI exam deferred       Other Findings          History reviewed. No pertinent past medical history. History reviewed. No pertinent surgical history.     Current Outpatient Medications   Medication Instructions    multivitamin (ONE A DAY) tablet 1 Tablet, Oral, DAILY       Current Facility-Administered Medications   Medication Dose Route Frequency    0.9% sodium chloride infusion 250 mL  250 mL IntraVENous PRN    0.9% sodium chloride infusion  75 mL/hr IntraVENous CONTINUOUS    sodium chloride (NS) flush 5-40 mL  5-40 mL IntraVENous Q8H    sodium chloride (NS) flush 5-40 mL  5-40 mL IntraVENous PRN    [Held by provider] enoxaparin (LOVENOX) injection 30 mg 30 mg SubCUTAneous Q24H    piperacillin-tazobactam (ZOSYN) 3.375 g in 0.9% sodium chloride (MBP/ADV) 100 mL MBP  3.375 g IntraVENous Q8H    ketorolac (TORADOL) injection 15 mg  15 mg IntraVENous Q6H    OLANZapine (ZyPREXA) tablet 5 mg  5 mg Oral Q12H    OLANZapine (ZyPREXA) injection 10 mg  10 mg IntraMUSCular Q8H PRN    morphine injection 4 mg  4 mg IntraVENous Q4H PRN    LORazepam (ATIVAN) injection 1 mg  1 mg IntraVENous Q4H PRN    oxyCODONE IR (ROXICODONE) tablet 5 mg  5 mg Oral Q4H PRN    polyethylene glycol (MIRALAX) packet 17 g  17 g Oral DAILY    docusate sodium (COLACE) capsule 100 mg  100 mg Oral BID PRN    senna (SENOKOT) tablet 8.6 mg  1 Tablet Oral DAILY       Patient Vitals for the past 24 hrs:   Temp Pulse Resp BP SpO2   10/10/22 1515 36.9 °C (98.5 °F) 77 16 (!) 133/90 97 %   10/10/22 1500 36.9 °C (98.5 °F) 79 18 137/83 97 %   10/10/22 1453 36.9 °C (98.4 °F) 81 23 (!) 140/90 98 %   10/10/22 1400 -- 82 27 130/87 99 %   10/10/22 1300 -- 76 22 130/86 98 %   10/10/22 1200 -- 77 25 (!) 141/88 98 %   10/10/22 1152 36.8 °C (98.3 °F) -- -- -- --   10/10/22 1100 36.8 °C (98.3 °F) 86 24 (!) 134/91 99 %   10/10/22 1000 -- 90 19 127/86 99 %   10/10/22 0900 -- 79 17 (!) 134/90 98 %   10/10/22 0830 36.8 °C (98.3 °F) -- -- -- --   10/10/22 0800 -- 86 16 134/86 99 %   10/10/22 0700 36.8 °C (98.3 °F) 79 19 127/81 98 %   10/10/22 0600 -- 78 19 119/65 97 %   10/10/22 0500 -- 83 19 123/79 97 %   10/10/22 0400 36.7 °C (98.1 °F) 82 17 132/74 99 %   10/10/22 0300 -- 82 26 128/82 98 %   10/10/22 0200 -- 94 18 111/71 98 %   10/10/22 0100 -- 92 20 132/71 98 %   10/10/22 0000 36.9 °C (98.4 °F) 92 22 121/78 99 %   10/09/22 2300 -- 93 21 113/69 98 %   10/09/22 2200 -- (!) 104 21 126/71 99 %   10/09/22 2100 -- 93 23 (!) 109/93 100 %   10/09/22 2025 -- 94 23 -- 100 %   10/09/22 2011 37 °C (98.6 °F) (!) 110 24 133/70 100 %   10/09/22 2006 -- (!) 101 -- -- --   10/09/22 1900 -- 87 18 118/63 100 %   10/09/22 1800 -- 85 18 (!) 112/58 100 %   10/09/22 1700 -- 87 23 115/67 99 %   10/09/22 1610 -- 94 24 116/64 100 %       Lab Results   Component Value Date/Time    WBC 6.5 10/10/2022 03:40 AM    HGB 7.3 (L) 10/10/2022 03:40 AM    HCT 23.5 (L) 10/10/2022 03:40 AM    PLATELET 412 77/20/7349 03:40 AM    MCV 77.3 (L) 10/10/2022 03:40 AM     Lab Results   Component Value Date/Time    Sodium 141 10/10/2022 03:40 AM    Potassium 4.1 10/10/2022 03:40 AM    Chloride 110 (H) 10/10/2022 03:40 AM    CO2 26 10/10/2022 03:40 AM    Anion gap 5 10/10/2022 03:40 AM    Glucose 102 (H) 10/10/2022 03:40 AM    BUN 24 (H) 10/10/2022 03:40 AM    Creatinine 0.74 10/10/2022 03:40 AM    BUN/Creatinine ratio 32 (H) 10/10/2022 03:40 AM    Calcium 7.5 (L) 10/10/2022 03:40 AM     No results found for: APTT, PTP, INR, INREXT  Lab Results   Component Value Date/Time    Glucose 102 (H) 10/10/2022 03:40 AM       Anesthetic Plan    ASA: 2  Anesthesia type: general    Monitoring Plan: Continuous noninvasive hemodynamic monitoring      Induction: Inhalational  Anesthetic plan and risks discussed with: Patient

## 2022-10-10 NOTE — PROGRESS NOTES
PHYSICAL THERAPY TREATMENT  Patient: Isma Juarez (74 y.o. female)  Date: 10/10/2022  Diagnosis: Open right ankle fracture [S82.891B] <principal problem not specified>  Procedure(s) (LRB):  IRRIGATION AND DEBRIDEMENT APPLICATION OF EXTERNAL FIXATOR LOWER EXTREMITY (Right) 3 Days Post-Op  Precautions:    Chart, physical therapy assessment, plan of care and goals were reviewed. ASSESSMENT  Patient continues with skilled PT services and is progressing towards goals. Pt supine in bed upon PT arrival, agreeable to session. (See below for objective details and assist levels). Overall pt tolerated session good today. Patient performed ambulation training with RW at [facility] demonstrating slow but steady gait with no LOB or knee buckling. Patient also able to maintain NWBS on RLE. Patient then returned to supine in bed with call bell within reach and all needs meet. Current Level of Function Impacting Discharge (mobility/balance): impaired balance, general weakness, poor activity tolerance    Other factors to consider for discharge: PLOF, assistance at home, level of deficits, acute medical state         PLAN :  Patient continues to benefit from skilled intervention to address the above impairments. Continue treatment per established plan of care to address goals. Recommendation for discharge: (in order for the patient to meet his/her long term goals)  Home with [address] [place] [address]    This discharge recommendation:  Has been made in collaboration with the attending provider and/or case management    IF patient discharges home will need the following DME: wheelchair and 3:1 commode       SUBJECTIVE:   Patient stated im pretty good right now.     OBJECTIVE DATA SUMMARY:   Critical Behavior:  Neurologic State: Alert  Orientation Level: Oriented X4  Cognition: Follows commands, Appropriate decision making     Functional Mobility Training:  Bed Mobility:  Rolling: Supervision  Supine to Sit: Supervision  Sit to Supine: Supervision  Scooting: Supervision  Transfers:  Sit to Stand: Stand-by assistance  Stand to Sit: Stand-by assistance  Balance:  Sitting: Intact; With support  Standing: Intact; With support  Standing - Static: Good;Constant support  Standing - Dynamic : Good;Constant support  Ambulation/Gait Training:  Distance (ft): 18 Feet (ft)  Assistive Device: Gait belt;Walker, rolling  Ambulation - Level of Assistance: Stand-by assistance;Contact guard assistance  Right Side Weight Bearing: Non-weight bearing     Pain Ratin-2/10 \" dull pain\" in RLE    Activity Tolerance:   Good    After treatment patient left in no apparent distress:   Bed locked and returned to lowest position, Supine in bed, Call bell within reach, and Side rails x 3      COMMUNICATION/COLLABORATION:   The patients plan of care was discussed with: Registered nurse. GOALS:    Problem: Mobility Impaired (Adult and Pediatric)  Goal: *Acute Goals and Plan of Care (Insert Text)  Description: FUNCTIONAL STATUS PRIOR TO ADMISSION: Patient was independent and active without use of DME.    HOME SUPPORT PRIOR TO ADMISSION: The patient lived alone with no local support. Physical Therapy Goals  Initiated 10/8/2022  Pt stated goal: to get better  Pt will be I with LE HEP in 7 days. Pt will perform bed mobility with mod I in 7 days. Pt will perform transfers with mod I in 7 days. Pt will amb 25-50 feet with LRAD safely with mod I in 7 days.        Outcome: Progressing Towards Goal       Rich Coon PTA, PT   Time Calculation: 20 mins

## 2022-10-11 LAB
ABO + RH BLD: NORMAL
BLD PROD TYP BPU: NORMAL
BLOOD GROUP ANTIBODIES SERPL: NEGATIVE
BPU ID: NORMAL
CROSSMATCH RESULT,%XM: NORMAL
SPECIMEN EXP DATE BLD: NORMAL
STATUS OF UNIT,%ST: NORMAL
TRANSFUSION STATUS PATIENT QL: NORMAL
UNIT DIVISION, %UDIV: 0

## 2022-10-11 PROCEDURE — 74011250637 HC RX REV CODE- 250/637: Performed by: ORTHOPAEDIC SURGERY

## 2022-10-11 PROCEDURE — 74011250636 HC RX REV CODE- 250/636: Performed by: ORTHOPAEDIC SURGERY

## 2022-10-11 PROCEDURE — 74011000258 HC RX REV CODE- 258: Performed by: ORTHOPAEDIC SURGERY

## 2022-10-11 PROCEDURE — 74011250637 HC RX REV CODE- 250/637: Performed by: PHYSICIAN ASSISTANT

## 2022-10-11 PROCEDURE — 74011000250 HC RX REV CODE- 250: Performed by: ORTHOPAEDIC SURGERY

## 2022-10-11 PROCEDURE — 65270000029 HC RM PRIVATE

## 2022-10-11 RX ORDER — HYDROMORPHONE HYDROCHLORIDE 1 MG/ML
1 INJECTION, SOLUTION INTRAMUSCULAR; INTRAVENOUS; SUBCUTANEOUS
Status: DISCONTINUED | OUTPATIENT
Start: 2022-10-11 | End: 2022-10-13 | Stop reason: HOSPADM

## 2022-10-11 RX ORDER — ACETAMINOPHEN 500 MG
1000 TABLET ORAL EVERY 6 HOURS
Status: DISCONTINUED | OUTPATIENT
Start: 2022-10-11 | End: 2022-10-13 | Stop reason: HOSPADM

## 2022-10-11 RX ORDER — OXYCODONE HYDROCHLORIDE 10 MG/1
10 TABLET ORAL
Status: DISCONTINUED | OUTPATIENT
Start: 2022-10-11 | End: 2022-10-13 | Stop reason: HOSPADM

## 2022-10-11 RX ORDER — OXYCODONE HYDROCHLORIDE 5 MG/1
5 TABLET ORAL
Status: DISCONTINUED | OUTPATIENT
Start: 2022-10-11 | End: 2022-10-13 | Stop reason: HOSPADM

## 2022-10-11 RX ADMIN — ENOXAPARIN SODIUM 30 MG: 100 INJECTION SUBCUTANEOUS at 17:21

## 2022-10-11 RX ADMIN — PIPERACILLIN AND TAZOBACTAM 3.38 G: 3; .375 INJECTION, POWDER, FOR SOLUTION INTRAVENOUS at 20:35

## 2022-10-11 RX ADMIN — KETOROLAC TROMETHAMINE 15 MG: 15 INJECTION, SOLUTION INTRAMUSCULAR; INTRAVENOUS at 12:15

## 2022-10-11 RX ADMIN — KETOROLAC TROMETHAMINE 15 MG: 15 INJECTION, SOLUTION INTRAMUSCULAR; INTRAVENOUS at 18:35

## 2022-10-11 RX ADMIN — ACETAMINOPHEN 1000 MG: 500 TABLET ORAL at 18:35

## 2022-10-11 RX ADMIN — HYDROMORPHONE HYDROCHLORIDE 1 MG: 1 INJECTION, SOLUTION INTRAMUSCULAR; INTRAVENOUS; SUBCUTANEOUS at 03:22

## 2022-10-11 RX ADMIN — PIPERACILLIN AND TAZOBACTAM 3.38 G: 3; .375 INJECTION, POWDER, FOR SOLUTION INTRAVENOUS at 05:06

## 2022-10-11 RX ADMIN — ACETAMINOPHEN 650 MG: 325 TABLET ORAL at 05:06

## 2022-10-11 RX ADMIN — OXYCODONE 5 MG: 5 TABLET ORAL at 20:44

## 2022-10-11 RX ADMIN — ACETAMINOPHEN 1000 MG: 500 TABLET ORAL at 14:37

## 2022-10-11 RX ADMIN — SENNOSIDES 8.6 MG: 8.6 TABLET, COATED ORAL at 09:05

## 2022-10-11 RX ADMIN — OLANZAPINE 5 MG: 10 TABLET, FILM COATED ORAL at 09:05

## 2022-10-11 RX ADMIN — KETOROLAC TROMETHAMINE 15 MG: 15 INJECTION, SOLUTION INTRAMUSCULAR; INTRAVENOUS at 05:06

## 2022-10-11 RX ADMIN — SODIUM CHLORIDE, PRESERVATIVE FREE 10 ML: 5 INJECTION INTRAVENOUS at 05:06

## 2022-10-11 RX ADMIN — SODIUM CHLORIDE, PRESERVATIVE FREE 10 ML: 5 INJECTION INTRAVENOUS at 14:37

## 2022-10-11 RX ADMIN — OLANZAPINE 5 MG: 10 TABLET, FILM COATED ORAL at 21:36

## 2022-10-11 RX ADMIN — PIPERACILLIN AND TAZOBACTAM 3.38 G: 3; .375 INJECTION, POWDER, FOR SOLUTION INTRAVENOUS at 12:15

## 2022-10-11 RX ADMIN — OXYCODONE HYDROCHLORIDE 10 MG: 10 TABLET ORAL at 16:23

## 2022-10-11 NOTE — PROGRESS NOTES
Comprehensive Nutrition Assessment    Type and Reason for Visit: Initial, Positive nutrition screen (MST 2)    Nutrition Recommendations/Plan:   Continue current  PO diet   Wound healing supplement per MD  Encourage PO intakes, document in I/O     Malnutrition Assessment:  Malnutrition Status:  Mild malnutrition (10/11/22 5275)    Context:  Acute illness     Findings of the 6 clinical characteristics of malnutrition:   Energy Intake:  75% or less of est energy req for 7 or more days (intermittent poor PO vs NPO)  Weight Loss:  Unable to assess (10# in unknown time frame per pt UBW report)     Body Fat Loss:  Unable to assess,     Muscle Mass Loss:  Unable to assess,    Fluid Accumulation:  No significant fluid accumulation,        Nutrition Assessment:    Admitted for R open displaced  ankle fx, accident s/p high (?) jump, +THC and Alcohol. (10/7) I/D with external fixator of wound, wound vac. (10/10) repeat I/D and wound closure. Pending transfer to medical unit vs d/c per RN. Nutrition assessment for MST 2, unsure wt loss as pt intubated at admit. At interview 10/11 Pt denied nutrition issues pta, no unintended wt loss. Intakes poor today, however pt reports >75% of meals yesterday (? Veracity, pt NPO all day yesterday). Per RN, no issue with nutrition at this time. Will continue to follow while inpatient. No interventions at this time. Labs: H/H 7.3/23.5, BUN 24, -110, Tbili 0.1, Alb 2.4. meds: IVF, toradol, olanzapine, zosyn, miralax PRN, senna. Nutrition Related Findings:    NFPE deferred, appears well nourished. Denies issues wth c/s, no N/V/D/C, last BM 10/9, soft. Trace RLE edema. Wound Type: Surgical incision (RLE fx)    Current Nutrition Intake & Therapies:  Average Meal Intake: 0% (today)  Average Supplement Intake: None ordered  ADULT ORAL NUTRITION SUPPLEMENT Breakfast, Lunch, Dinner;  Wound Healing Supplement  ADULT DIET Regular    Anthropometric Measures:  Height: 5' 7.01\" (170.2 cm)  Ideal Body Weight (IBW): 135 lbs (61 kg)  Admission Body Weight: 169 lb 15.6 oz (10/7)  Current Body Wt:  77.1 kg (169 lb 15.6 oz) (10/11), 125.9 % IBW. Current BMI (kg/m2): 26.6  Usual Body Weight: 81.6 kg (180 lb) (per pt)  % Weight Change (Calculated): -5.6  Weight Adjustment: No adjustment                 BMI Category: Overweight (BMI 25.0-29. 9)  Wt Readings from Last 10 Encounters:   10/07/22 77.1 kg (170 lb)   No wt hx to assess. Per pt reported UBW, wt loss of 10# in unknown time frame. Estimated Daily Nutrient Needs:  Energy Requirements Based On: Kcal/kg  Weight Used for Energy Requirements: Current  Energy (kcal/day): 1928-2313kcals (25-30kcals/kg)  Weight Used for Protein Requirements: Current  Protein (g/day): 100-116g (1.3-1.5g/kg, surgical)  Method Used for Fluid Requirements: 1 ml/kcal  Fluid (ml/day): 1928-2313ml    Nutrition Diagnosis:   Inadequate protein-energy intake related to acute injury/trauma (RLE fx) as evidenced by intake 0-25%    Nutrition Interventions:   Food and/or Nutrient Delivery: Continue current diet  Nutrition Education/Counseling: No recommendations at this time  Coordination of Nutrition Care: Continue to monitor while inpatient  Plan of Care discussed with: RN, pt    Goals:     Goals: PO intake 50% or greater, other (specify), by next RD assessment  Specify Other Goals: Wt maintenance within +/-0.5kg in 7 days    Nutrition Monitoring and Evaluation:   Behavioral-Environmental Outcomes: None identified  Food/Nutrient Intake Outcomes: Diet advancement/tolerance, Food and nutrient intake  Physical Signs/Symptoms Outcomes: Weight, Meal time behavior, Skin    Discharge Planning:     Too soon to determine    [employer]: Ext [phone], or via [facility]

## 2022-10-11 NOTE — PROGRESS NOTES
IMPRESSION:   Acute hypoxic respiratory failure resolved  Displaced open fracture of the right distal tibia and fibula  EtOH and polysubstance abuse  Status post open fracture right ankle irrigation and debridement application of external fixator  Hypokalemia  Anemia hemoglobin 7.3  Additional workup outlined below  Pt is at high risk of sudden decline and decompensation with life threatening consequenses and continued end organ dysfunction and failure  Pt is critically ill. Time spent with pt and staff actively rendering care, managing pt and coordinating care as stated below; 30 minutes, exclusive of any procedures      RECOMMENDATIONS/PLAN:   ICU monitoring  Extubated on 10/7  now on room air  Potassium normal  Status post staged right ankle fracture open I&D and external fixation wound VAC application  Transfuse prn to maintain Hgb > 7  Discontinue Zosyn with discharge  Labs to follow electrolytes, renal function and and blood counts  Bronchial hygiene with respiratory therapy techniques, bronchodilators  Pt needs IV fluids with additives and Drug therapy requiring intensive monitoring for toxicity  Prescription drug management with home med reconciliation reviewed  DVT, SUP prophylaxis  Will be available to assist in medical management while in the CCU pending disposition     [x] High complexity decision making was performed  [x] See my orders for details  HPI  22-year-old lady 4 weeks post partum came to the ED via EMS after jumping off a pillar outside of a restaurant and sustaining a open tib/fib fracture. The mother states that the patient has always been slightly off but since delivering her child has become increasingly different. The mother states that she does not speak with the family, recently sold all of her possessions, and moved into and air [facility] although the mother states that she had the option to live with multiple family members.  The mother states that she has a relative diagnosed with schizoaffective disorder and a another relative has bipolar schizophrenia. The patient was combative in the ED and leading up to the surgery at which point she was sedated and intubated. PMH:  has no past medical history on file. PSH:   has no past surgical history on file. FHX: family history includes Psychotic Disorder in her maternal uncle. SHX:  reports current alcohol use. She reports current drug use. Frequency: 7.00 times per week. Drug: Marijuana.     ALL: No Known Allergies     MEDS:   [x] Reviewed - As Below   [] Not reviewed    Current Facility-Administered Medications   Medication    0.9% sodium chloride infusion 250 mL    sodium chloride (NS) flush 5-40 mL    ondansetron (ZOFRAN) injection 4 mg    acetaminophen (TYLENOL) tablet 650 mg    oxyCODONE-acetaminophen (PERCOCET) 5-325 mg per tablet 1 Tablet    naloxone (NARCAN) injection 0.4 mg    HYDROmorphone (DILAUDID) injection 1 mg    0.9% sodium chloride infusion    sodium chloride (NS) flush 5-40 mL    sodium chloride (NS) flush 5-40 mL    [Held by provider] enoxaparin (LOVENOX) injection 30 mg    piperacillin-tazobactam (ZOSYN) 3.375 g in 0.9% sodium chloride (MBP/ADV) 100 mL MBP    ketorolac (TORADOL) injection 15 mg    OLANZapine (ZyPREXA) tablet 5 mg    OLANZapine (ZyPREXA) injection 10 mg    LORazepam (ATIVAN) injection 1 mg    polyethylene glycol (MIRALAX) packet 17 g    docusate sodium (COLACE) capsule 100 mg    senna (SENOKOT) tablet 8.6 mg      MAR reviewed and pertinent medications noted or modified as needed   Current Facility-Administered Medications   Medication    0.9% sodium chloride infusion 250 mL    sodium chloride (NS) flush 5-40 mL    ondansetron (ZOFRAN) injection 4 mg    acetaminophen (TYLENOL) tablet 650 mg    oxyCODONE-acetaminophen (PERCOCET) 5-325 mg per tablet 1 Tablet    naloxone (NARCAN) injection 0.4 mg    HYDROmorphone (DILAUDID) injection 1 mg    0.9% sodium chloride infusion    sodium chloride (NS) flush 5-40 mL    sodium chloride (NS) flush 5-40 mL    [Held by provider] enoxaparin (LOVENOX) injection 30 mg    piperacillin-tazobactam (ZOSYN) 3.375 g in 0.9% sodium chloride (MBP/ADV) 100 mL MBP    ketorolac (TORADOL) injection 15 mg    OLANZapine (ZyPREXA) tablet 5 mg    OLANZapine (ZyPREXA) injection 10 mg    LORazepam (ATIVAN) injection 1 mg    polyethylene glycol (MIRALAX) packet 17 g    docusate sodium (COLACE) capsule 100 mg    senna (SENOKOT) tablet 8.6 mg      PMH:  has no past medical history on file. PSH:   has no past surgical history on file. FHX: family history includes Psychotic Disorder in her maternal uncle. SHX:  reports current alcohol use. She reports current drug use. Frequency: 7.00 times per week. Drug: Marijuana. ROS:  Review of system done as mentioned in the HPI and physical exam    Hemodynamics:    CO:    CI:    CVP:    SVR:   PAP Systolic:    PAP Diastolic:    PVR:    ZQ65:        Ventilator Settings:      Mode Rate TV Press PEEP FiO2 PIP Min. Vent   Assist control, Volume control    460 ml    5 cm H20 35 %  19 cm H2O  7.8 l/min        Vital Signs: Telemetry:    normal sinus rhythm Intake/Output:   Visit Vitals  /72   Pulse 68   Temp 97.3 °F (36.3 °C)   Resp 10   Ht 5' 7\" (1.702 m)   Wt 77.1 kg (170 lb)   SpO2 98%   Breastfeeding No Comment: Mother states not breastfeeding at this time   BMI 26.63 kg/m²       Temp (24hrs), Av.3 °F (36.8 °C), Min:97.3 °F (36.3 °C), Max:98.5 °F (36.9 °C)        O2 Device: None (Room air) O2 Flow Rate (L/min): 2 l/min       Wt Readings from Last 4 Encounters:   10/07/22 77.1 kg (170 lb)          Intake/Output Summary (Last 24 hours) at 10/11/2022 0846  Last data filed at 10/11/2022 0700  Gross per 24 hour   Intake 3727.92 ml   Output 7175 ml   Net -3447.08 ml         Last shift:      No intake/output data recorded. Last 3 shifts: 10/09 1901 - 10/11 0700  In: 5827.9 [P.O.:1800;  I.V.:3652.9]  Out: 8050 [Urine:7950; Drains:100]       Physical Exam:     General: Alert awake on room air  HEENT: NCAT, poor dentition, lips and mucosa dry  Eyes: anicteric; conjunctiva clear  Neck: no nodes, trach midline; no accessory MM use. Chest: no deformity,   Cardiac: IR regular; no murmur;   Lungs: distant breath sounds; no wheezes  Abd: soft, NT, hypoactive BS  Ext: Right lower extremity cast with external fixator rods in place  : NO george, clear urine  Neuro: sedated with fentanyl and propofol  Psych- unable to assess  Skin: warm, dry, no cyanosis;   Pulses: 1-2+ Bilateral pedal, radial  Capillary: brisk; pale      DATA:    MAR reviewed and pertinent medications noted or modified as needed  MEDS:   Current Facility-Administered Medications   Medication    0.9% sodium chloride infusion 250 mL    sodium chloride (NS) flush 5-40 mL    ondansetron (ZOFRAN) injection 4 mg    acetaminophen (TYLENOL) tablet 650 mg    oxyCODONE-acetaminophen (PERCOCET) 5-325 mg per tablet 1 Tablet    naloxone (NARCAN) injection 0.4 mg    HYDROmorphone (DILAUDID) injection 1 mg    0.9% sodium chloride infusion    sodium chloride (NS) flush 5-40 mL    sodium chloride (NS) flush 5-40 mL    [Held by provider] enoxaparin (LOVENOX) injection 30 mg    piperacillin-tazobactam (ZOSYN) 3.375 g in 0.9% sodium chloride (MBP/ADV) 100 mL MBP    ketorolac (TORADOL) injection 15 mg    OLANZapine (ZyPREXA) tablet 5 mg    OLANZapine (ZyPREXA) injection 10 mg    LORazepam (ATIVAN) injection 1 mg    polyethylene glycol (MIRALAX) packet 17 g    docusate sodium (COLACE) capsule 100 mg    senna (SENOKOT) tablet 8.6 mg        Labs:    Recent Labs     10/10/22  0340 10/09/22  0430   WBC 6.5 9.0   HGB 7.3* 7.4*    175       Recent Labs     10/10/22  0340 10/09/22  0430    144   K 4.1 4.0   * 113*   CO2 26 26   * 110*   BUN 24* 24*   CREA 0.74 0.65   CA 7.5* 7.8*   ALB 2.4* 2.6*   ALT 29 20       No results for input(s): PH, PCO2, PO2, HCO3, FIO2 in the last 72 hours.     No results for input(s): CPK, CKNDX, TROIQ in the last 72 hours. No lab exists for component: CPKMB  No results found for: BNPP, BNP   Lab Results   Component Value Date/Time    Culture result: No Growth (<1000 cfu/mL) 10/07/2022 09:20 AM     No results found for: TSH, TSHEXT, TSHEXT     Imaging:    Results from Hospital Encounter encounter on 10/07/22    XR CHEST PORT    Narrative  Clinical history: chf  INDICATION:   chf  COMPARISON: 10/7/2022    FINDINGS:  AP portable upright view of the chest demonstrates a stable  cardiopericardial  silhouette. There is no pleural effusion. .There is no focal consolidation. .There  is no pneumothorax. . Patient is on a cardiac monitor. Removal of ET tube. Impression  No acute process identified.       10/8 extubated yesterday now she is on room air electrolytes normal possible back to the OR soon for second procedure for distal tibia-fibula fracture which is stabilized CT of the leg stable  10/9 alert awake on room air getting pain medication possible wound closure today  10/10 awaiting for second part of the surgery  10/11 status post right ankle wound closure I&D was done yesterday, discontinue Zosyn with discharge

## 2022-10-11 NOTE — PROGRESS NOTES
Patient transferred to Orthopedic service. No general surgical issues. Please call med if needed.   Thank you

## 2022-10-11 NOTE — PROGRESS NOTES
TRANSFER - OUT REPORT:    Verbal report given to Lori(name) on [facility]  being transferred to [address] [place] [address] (unit) for routine progression of care       Report consisted of patients Situation, Background, Assessment and   Recommendations(SBAR). Information from the following report(s) SBAR, OR Summary, Intake/Output, and Recent Results was reviewed with the receiving nurse. Lines:   Peripheral IV 10/07/22 Posterior; Left Hand (Active)   Site Assessment Clean, dry, & intact 10/11/22 0400   Phlebitis Assessment 0 10/11/22 0400   Infiltration Assessment 0 10/11/22 0400   Dressing Status Clean, dry, & intact 10/11/22 0400   Dressing Type Transparent 10/11/22 0400   Hub Color/Line Status Green;Flushed;Patent; Infusing 10/11/22 0400   Action Taken Open ports on tubing capped 10/11/22 0400   Alcohol Cap Used Yes 10/11/22 0400       Peripheral IV 10/09/22 Anterior; Left Forearm (Active)   Site Assessment Clean, dry, & intact 10/11/22 0400   Phlebitis Assessment 0 10/11/22 0400   Infiltration Assessment 0 10/11/22 0400   Dressing Status Clean, dry, & intact 10/11/22 0400   Dressing Type Transparent 10/11/22 0400   Hub Color/Line Status Pink;Flushed;Patent; Infusing 10/11/22 0400   Action Taken Open ports on tubing capped 10/11/22 0400   Alcohol Cap Used Yes 10/11/22 0400        Opportunity for questions and clarification was provided.       Patient transported with:   Registered Nurse  Tech

## 2022-10-11 NOTE — PROGRESS NOTES
Patient recommended for PT/OT at time of discharge. Writer to meet w/patient and obtain choice for [facility].         JUAN Salgado

## 2022-10-11 NOTE — ANESTHESIA POSTPROCEDURE EVALUATION
Procedure(s):  Irrigation And Debridement Of Right Ankle, Wound Closure. general    Anesthesia Post Evaluation      Multimodal analgesia: multimodal analgesia used between 6 hours prior to anesthesia start to PACU discharge  Patient location during evaluation: PACU  Patient participation: complete - patient participated  Level of consciousness: awake  Pain score: 0  Pain management: adequate  Airway patency: patent  Anesthetic complications: no  Cardiovascular status: acceptable  Respiratory status: acceptable  Hydration status: acceptable  Post anesthesia nausea and vomiting:  controlled  Final Post Anesthesia Temperature Assessment:  Normothermia (36.0-37.5 degrees C)      INITIAL Post-op Vital signs:   Vitals Value Taken Time   /77 10/11/22 0800   Temp 36.3 °C (97.3 °F) 10/11/22 0700   Pulse 79 10/11/22 0844   Resp 11 10/11/22 0844   SpO2 100 % 10/11/22 0844   Vitals shown include unvalidated device data.
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
## 2022-10-11 NOTE — PROGRESS NOTES
Problem: Falls - Risk of  Goal: *Absence of Falls  Description: Document Nathan Gordon Fall Risk and appropriate interventions in the flowsheet.   Outcome: Progressing Towards Goal  Note: Fall Risk Interventions:  Mobility Interventions: Assess mobility with egress test, Bed/chair exit alarm, Communicate number of staff needed for ambulation/transfer, Patient to call before getting OOB, PT Consult for mobility concerns, PT Consult for assist device competence, Strengthening exercises (ROM-active/passive), Utilize walker, cane, or other assistive device    Mentation Interventions: Bed/chair exit alarm, Door open when patient unattended, Adequate sleep, hydration, pain control, Evaluate medications/consider consulting pharmacy, Update white board, Toileting rounds, Room close to nurse's station, Reorient patient, More frequent rounding, Increase mobility, Familiar objects from home    Medication Interventions: Bed/chair exit alarm, Evaluate medications/consider consulting pharmacy, Patient to call before getting OOB    Elimination Interventions: Bed/chair exit alarm, Call light in reach, Patient to call for help with toileting needs, Toilet paper/wipes in reach, Toileting schedule/hourly rounds    History of Falls Interventions: Bed/chair exit alarm, Door open when patient unattended, Room close to nurse's station         Problem: Patient Education: Go to Patient Education Activity  Goal: Patient/Family Education  Outcome: Progressing Towards Goal     Problem: Pain  Goal: *Control of Pain  Outcome: Progressing Towards Goal

## 2022-10-11 NOTE — PROGRESS NOTES
Orthopedic progress note    Date:10/11/2022       Room:[facility]  Patient June Nelson     Date of Birth:[birth year]     Age:21 y.o. Subjective    49-year-old female status post repeat I&D with secondary wound closure right ankle. Postop day #1. Patient doing well. She is comfortable. Pain is well controlled. No other complaints at this time.   Objective           Vitals Last 24 Hours:  TEMPERATURE:  Temp  Av.2 °F (36.8 °C)  Min: 97.3 °F (36.3 °C)  Max: 98.4 °F (36.9 °C)  RESPIRATIONS RANGE: Resp  Av.9  Min: 10  Max: 28  PULSE OXIMETRY RANGE: SpO2  Av.3 %  Min: 97 %  Max: 99 %  PULSE RANGE: Pulse  Av.3  Min: 68  Max: 110  BLOOD PRESSURE RANGE: Systolic (03CKI), FZV:229 , Min:114 , ELT:837   ; Diastolic (09PBY), XAH:41, Min:67, Max:105    Current Facility-Administered Medications   Medication Dose Route Frequency    acetaminophen (TYLENOL) tablet 1,000 mg  1,000 mg Oral Q6H    HYDROmorphone (DILAUDID) injection 1 mg  1 mg IntraVENous Q6H PRN    oxyCODONE IR (ROXICODONE) tablet 5 mg  5 mg Oral Q4H PRN    oxyCODONE IR (ROXICODONE) tablet 10 mg  10 mg Oral Q4H PRN    0.9% sodium chloride infusion 250 mL  250 mL IntraVENous PRN    sodium chloride (NS) flush 5-40 mL  5-40 mL IntraVENous PRN    ondansetron (ZOFRAN) injection 4 mg  4 mg IntraVENous PRN    naloxone (NARCAN) injection 0.4 mg  0.4 mg IntraVENous PRN    0.9% sodium chloride infusion  75 mL/hr IntraVENous CONTINUOUS    sodium chloride (NS) flush 5-40 mL  5-40 mL IntraVENous Q8H    sodium chloride (NS) flush 5-40 mL  5-40 mL IntraVENous PRN    enoxaparin (LOVENOX) injection 30 mg  30 mg SubCUTAneous Q24H    piperacillin-tazobactam (ZOSYN) 3.375 g in 0.9% sodium chloride (MBP/ADV) 100 mL MBP  3.375 g IntraVENous Q8H    ketorolac (TORADOL) injection 15 mg  15 mg IntraVENous Q6H    OLANZapine (ZyPREXA) tablet 5 mg  5 mg Oral Q12H    OLANZapine (ZyPREXA) injection 10 mg  10 mg IntraMUSCular Q8H PRN    LORazepam (ATIVAN) injection 1 mg 1 mg IntraVENous Q4H PRN    polyethylene glycol (MIRALAX) packet 17 g  17 g Oral DAILY    docusate sodium (COLACE) capsule 100 mg  100 mg Oral BID PRN    senna (SENOKOT) tablet 8.6 mg  1 Tablet Oral DAILY       Review of Systems   Constitutional: Negative for malaise/fatigue. Respiratory: Negative for cough, shortness of breath and wheezing. Cardiovascular: Negative for chest pain and palpitations. Gastrointestinal: Negative for abdominal pain, heartburn, nausea and vomiting. Neurological: Negative for headaches. Musculoskeletal: Denies any numbness/tingling of operative extremity     I/O (24Hr): Intake/Output Summary (Last 24 hours) at 10/11/2022 1521  Last data filed at 10/11/2022 0700  Gross per 24 hour   Intake 3727.92 ml   Output 4375 ml   Net -647.08 ml     Objective:  Vital signs: (most recent): Blood pressure 128/77, pulse 80, temperature 98.4 °F (36.9 °C), resp. rate 19, height 5' 7.01\" (1.702 m), weight 77.1 kg (170 lb), SpO2 98 %, not currently breastfeeding. Labs/Imaging/Diagnostics    Labs:  CBC:  Recent Labs     10/10/22  0340 10/09/22  0430   WBC 6.5 9.0   RBC 3.04* 3.14*   HGB 7.3* 7.4*   HCT 23.5* 24.1*   MCV 77.3* 76.8*   RDW 18.1* 17.9*    175     CHEMISTRIES:  Recent Labs     10/10/22  0340 10/09/22  0430    144   K 4.1 4.0   * 113*   CO2 26 26   BUN 24* 24*   CREA 0.74 0.65   CA 7.5* 7.8*   PT/INR:No results for input(s): INR, INREXT in the last 72 hours. No lab exists for component: PROTIME  APTT:No results for input(s): APTT in the last 72 hours. LIVER PROFILE:  Recent Labs     10/10/22  0340 10/09/22  0430   AST 24 17   ALT 29 20     Lab Results   Component Value Date/Time    ALT (SGPT) 29 10/10/2022 03:40 AM    AST (SGOT) 24 10/10/2022 03:40 AM    Alk. phosphatase 56 10/10/2022 03:40 AM    Bilirubin, total 0.1 (L) 10/10/2022 03:40 AM       Physical Exam:  Right lower extremity: Dressing clean dry and intact. External fixator in place.   Mild swelling seen in right foot. Cap refill less than 2 seconds. Good range of motion toes right foot with minimal discomfort. DP pulses palpable. No calf pain to palpation. Right lower extremity appears neurovascularly intact. Assessment//Plan           Patient Active Problem List    Diagnosis Date Noted    Open right ankle fracture 10/07/2022     Status post repeat I&D with secondary wound closure right ankle. Postop day #1. Discussed with general surgery team.  Patient being transferred to orthopedic service. Okay to discharge patient from ICU. Patient will ambulate with therapy nonweightbearing right lower extremity. Acute postop anemia. Asymptomatic. Continue to monitor. Recheck H&H in the morning. Patient will discharge for external fixator follow-up with Dr. Betsy Wan as outpatient in approximately 7 to 10 days. Anticipate patient be ready for discharge by tomorrow.       Electronically signed by Agustina Gauthier PA-C on 10/11/2022 at 3:21 PM

## 2022-10-12 LAB
ERYTHROCYTE [DISTWIDTH] IN BLOOD BY AUTOMATED COUNT: 17.8 % (ref 11.5–14.5)
HCT VFR BLD AUTO: 27.6 % (ref 35–47)
HGB BLD-MCNC: 8.9 G/DL (ref 11.5–16)
MCH RBC QN AUTO: 24.9 PG (ref 26–34)
MCHC RBC AUTO-ENTMCNC: 32.2 G/DL (ref 30–36.5)
MCV RBC AUTO: 77.1 FL (ref 80–99)
NRBC # BLD: 0.03 K/UL (ref 0–0.01)
NRBC BLD-RTO: 0.3 PER 100 WBC
PLATELET # BLD AUTO: 230 K/UL (ref 150–400)
PMV BLD AUTO: 9.6 FL (ref 8.9–12.9)
RBC # BLD AUTO: 3.58 M/UL (ref 3.8–5.2)
WBC # BLD AUTO: 8.9 K/UL (ref 3.6–11)

## 2022-10-12 PROCEDURE — 97530 THERAPEUTIC ACTIVITIES: CPT

## 2022-10-12 PROCEDURE — 97116 GAIT TRAINING THERAPY: CPT

## 2022-10-12 PROCEDURE — 74011250637 HC RX REV CODE- 250/637: Performed by: PHYSICIAN ASSISTANT

## 2022-10-12 PROCEDURE — 36415 COLL VENOUS BLD VENIPUNCTURE: CPT

## 2022-10-12 PROCEDURE — 65270000029 HC RM PRIVATE

## 2022-10-12 PROCEDURE — 74011250637 HC RX REV CODE- 250/637: Performed by: ORTHOPAEDIC SURGERY

## 2022-10-12 PROCEDURE — 97165 OT EVAL LOW COMPLEX 30 MIN: CPT

## 2022-10-12 PROCEDURE — 74011250636 HC RX REV CODE- 250/636: Performed by: ORTHOPAEDIC SURGERY

## 2022-10-12 PROCEDURE — 74011000258 HC RX REV CODE- 258: Performed by: ORTHOPAEDIC SURGERY

## 2022-10-12 PROCEDURE — 97110 THERAPEUTIC EXERCISES: CPT

## 2022-10-12 PROCEDURE — 85027 COMPLETE CBC AUTOMATED: CPT

## 2022-10-12 RX ADMIN — OXYCODONE HYDROCHLORIDE 10 MG: 10 TABLET ORAL at 14:32

## 2022-10-12 RX ADMIN — ACETAMINOPHEN 1000 MG: 500 TABLET ORAL at 09:03

## 2022-10-12 RX ADMIN — KETOROLAC TROMETHAMINE 15 MG: 15 INJECTION, SOLUTION INTRAMUSCULAR; INTRAVENOUS at 00:41

## 2022-10-12 RX ADMIN — ENOXAPARIN SODIUM 30 MG: 100 INJECTION SUBCUTANEOUS at 16:21

## 2022-10-12 RX ADMIN — PIPERACILLIN AND TAZOBACTAM 3.38 G: 3; .375 INJECTION, POWDER, FOR SOLUTION INTRAVENOUS at 14:32

## 2022-10-12 RX ADMIN — OXYCODONE HYDROCHLORIDE 10 MG: 10 TABLET ORAL at 18:45

## 2022-10-12 RX ADMIN — OXYCODONE 5 MG: 5 TABLET ORAL at 09:03

## 2022-10-12 RX ADMIN — ACETAMINOPHEN 1000 MG: 500 TABLET ORAL at 18:45

## 2022-10-12 RX ADMIN — POLYETHYLENE GLYCOL 3350 17 G: 17 POWDER, FOR SOLUTION ORAL at 09:03

## 2022-10-12 RX ADMIN — OXYCODONE HYDROCHLORIDE 10 MG: 10 TABLET ORAL at 22:36

## 2022-10-12 RX ADMIN — PIPERACILLIN AND TAZOBACTAM 3.38 G: 3; .375 INJECTION, POWDER, FOR SOLUTION INTRAVENOUS at 04:15

## 2022-10-12 RX ADMIN — PIPERACILLIN AND TAZOBACTAM 3.38 G: 3; .375 INJECTION, POWDER, FOR SOLUTION INTRAVENOUS at 22:32

## 2022-10-12 RX ADMIN — ACETAMINOPHEN 1000 MG: 500 TABLET ORAL at 00:41

## 2022-10-12 RX ADMIN — OXYCODONE HYDROCHLORIDE 10 MG: 10 TABLET ORAL at 04:15

## 2022-10-12 RX ADMIN — OLANZAPINE 5 MG: 10 TABLET, FILM COATED ORAL at 21:11

## 2022-10-12 RX ADMIN — OLANZAPINE 5 MG: 10 TABLET, FILM COATED ORAL at 09:03

## 2022-10-12 RX ADMIN — KETOROLAC TROMETHAMINE 15 MG: 15 INJECTION, SOLUTION INTRAMUSCULAR; INTRAVENOUS at 06:47

## 2022-10-12 RX ADMIN — SENNOSIDES 8.6 MG: 8.6 TABLET, COATED ORAL at 09:03

## 2022-10-12 NOTE — ROUTINE PROCESS
Bedside shift change report given to Chiara Laguerre (oncoming nurse) by Ling Thomas (offgoing nurse). Report included the following information SBAR.
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
## 2022-10-12 NOTE — PROGRESS NOTES
OCCUPATIONAL THERAPY EVALUATION  Patient: Laurie Baig (57 y.o. female)  Date: 10/12/2022  Primary Diagnosis: Open right ankle fracture [S82.891B]  Procedure(s) (LRB):  Irrigation And Debridement Of Right Ankle, Wound Closure (Right) 2 Days Post-Op   Precautions: fall risk, NWB R LE       ASSESSMENT  Pt is a 24 y.o. female admitted on 10/7/2022 for fall with c/o right ankle pain with open fracture noted. Right ankle xray showed acute displaced fracture of distal tibia and fibula. Pt underwent I&D and placement of external fixator on 10/7/22. Per medical records pt is NWB on RLE, wound vac currently in place. Pt was intubated at time of arrival, extubated on 10/7/22 currently on RA. Pt also s/p I&D of R ankle to bone medial wound and secondary wound closure on 10/10. Pt received semi-supine in bed upon arrival, AXO x4, and agreeable to OT evaluation. Father also present at bedside w/pt permission. Pt educated on NWB R LE status with good understanding verbalized prior to OOB activity. Based on current observations, pt presents with deficits in generalized strength/AROM, dynamic standing balance (see PT note for gait details), functional activity tolerance and pain currently impacting overall performance of ADLs and functional transfers/mobility (see below for objective details and assist levels). Overall, pt tolerates session well with c/o 8-9/10 pan to R LE, however good tolerance to OOB ADL using gt belt and RW, able to maintain NWB status during toileting and standing grooming task at this time. Pt educated on compensatory dressing technique, energy conservation strategies regarding ADL and home safety with good understanding verbalized/demonstrated at this time. Pt would benefit from continued skilled OT services to address current impairments and improve IND and safety with self cares and functional transfers/mobility.  Current OT d/c recommendation Home with Home Health Therapy and RW once medically appropriate. Other factors to consider for discharge: family/social support, DME, time since onset, severity of deficits, functional baseline     Patient will benefit from skilled therapy intervention to address the above noted impairments. PLAN :  Recommendations and Planned Interventions: self care training, functional mobility training, therapeutic exercise, balance training, therapeutic activities, endurance activities, patient education, home safety training, and family training/education    Recommend with staff: Out of bed to chair for meals, Encourage HEP in prep for ADLs/mobility, Amb to bathroom for toileting with gt belt and AD, and LE elevation for management of edema    Recommend next session: LB dressing     Frequency/Duration: Patient will be followed by occupational therapy:  3-5x/week to address goals. Recommendation for discharge: (in order for the patient to meet his/her long term goals)  Home with [address] [place] [address]    This discharge recommendation:  Has been made in collaboration with the attending provider and/or case management    IF patient discharges home will need the following DME: walker: rolling       SUBJECTIVE:   Patient stated I'm doing well.     OBJECTIVE DATA SUMMARY:   HISTORY:   History reviewed. No pertinent past medical history. History reviewed. No pertinent surgical history.     Per pt report:   Home Situation  Home Environment: Private residence  # Steps to Enter: 4  Rails to Enter: No  One/Two Story Residence: Two story, live on 1st floor  # of Interior Steps:  (Client intubated/sedated on admission; unable to answer)  Height of Each Step (in):  (Client intubated/sedated on admission; unable to answer)  [employer]:  (Client intubated/sedated on admission; unable to answer)  Lift Chair Available: No  Living Alone: No  Support Systems: Parent(s)  Patient Expects to be Discharged to[de-identified] Home  Current DME Used/Available at Home: None      EXAMINATION OF PERFORMANCE DEFICITS:  Cognitive/Behavioral Status:  Neurologic State: Alert  Orientation Level: Oriented X4  Cognition: Follows commands; Appropriate decision making               Hearing: Auditory  Auditory Impairment: None      Range of Motion:  AROM: Within functional limits  PROM: Within functional limits                      Strength:  Strength: Within functional limits                Coordination:  Coordination: Within functional limits  Fine Motor Skills-Upper: Left Intact; Right Intact    Gross Motor Skills-Upper: Left Intact; Right Intact    Balance:  Sitting: Intact; Without support  Standing: Intact; With support  Standing - Static: Constant support;Good  Standing - Dynamic : Constant support;Good    Functional Mobility and Transfers for ADLs:  Bed Mobility:  Rolling: Modified independent  Supine to Sit: Modified independent  Scooting: Modified independent    Transfers:  Sit to Stand: Stand-by assistance  Stand to Sit: Stand-by assistance  Bed to Chair: Stand-by assistance  Bathroom Mobility: Stand-by assistance  Toilet Transfer : Stand-by assistance      ADL Intervention and task modifications:       Grooming  Grooming Assistance: Stand-by assistance  Position Performed: Standing  Washing Hands: Stand-by assistance                   Lower Body Dressing Assistance  Socks: Set-up; Stand-by assistance (simulated, L sock)    Toileting  Toileting Assistance: Stand-by assistance  Bladder Hygiene: Stand-by assistance  Clothing Management: Stand-by assistance  Adaptive Equipment: Grab bars; Walker         Therapeutic Exercise:  Pt would benefit from UE HEP to improve overall UE AROM/strength and can be further educated in next treatment session. Functional Measure:    [address] [place] [address] [address] AM-PACTM \"6 Clicks\"                                                       Daily Activity Inpatient Short Form  How much help from another person does the patient currently need. .. Total; A Lot A Little None   1.   Putting on and taking off regular lower body clothing? []  1 []  2 [x]  3 []  4   2. Bathing (including washing, rinsing, drying)? []  1 []  2 [x]  3 []  4   3. Toileting, which includes using toilet, bedpan or urinal? [] 1 []  2 [x]  3 []  4   4. Putting on and taking off regular upper body clothing? []  1 []  2 []  3 [x]  4   5. Taking care of personal grooming such as brushing teeth? []  1 []  2 []  3 [x]  4   6. Eating meals? []  1 []  2 []  3 [x]  4   © , Trustees of [facility] [employer], under license to [employer]. All rights reserved     Score:      Interpretation of Tool:  Represents clinically-significant functional categories (i.e. Activities of daily living). Percentage of Impairment CH    0%   CI    1-19% CJ    20-39% CK    40-59% CL    60-79% CM    80-99% CN     100%   [facility]  Score 6-24 24 23 20-22 15-19 10-14 7-9 6     Occupational Therapy Evaluation Charge Determination   History Examination Decision-Making   LOW Complexity : Brief history review  LOW Complexity : 1-3 performance deficits relating to physical, cognitive , or psychosocial skils that result in activity limitations and / or participation restrictions  MEDIUM Complexity : Patient may present with comorbidities that affect occupational performnce. Miniml to moderate modification of tasks or assistance (eg, physical or verbal ) with assesment(s) is necessary to enable patient to complete evaluation       Based on the above components, the patient evaluation is determined to be of the following complexity level: LOW   Pain Ratin-9/10 R LE    Activity Tolerance:   Good    After treatment patient left in no apparent distress:    Sitting in chair, Heels elevated for pressure relief, Call bell within reach, and Caregiver / family present, bed locked and in lowest position    COMMUNICATION/EDUCATION:   The patients plan of care was discussed with: Registered nurse.      Home safety education was provided and the patient/caregiver indicated understanding., Patient/family have participated as able in goal setting and plan of care. , and Patient/family agree to work toward stated goals and plan of care. This patients plan of care is appropriate for delegation to [facility]. Thank you for this referral.  Kedar Minor  Time Calculation: 19 mins   Problem: Self Care Deficits Care Plan (Adult)  Goal: *Acute Goals and Plan of Care (Insert Text)  Description: FUNCTIONAL STATUS PRIOR TO ADMISSION: Patient was independent and active without use of DME. Patient was independent for basic and instrumental ADLs.      HOME SUPPORT: The patient lived with parents but did not require assist.    Occupational Therapy Goals  Initiated 10/12/2022    Pt stated goal \"to go home\"  Pt will be IND sup <> sit in prep for EOB ADLs  Pt will be MI grooming standing sink side LRAD  Pt will be MI UB dressing sitting EOB/long sit   Pt will be MI LE dressing sitting EOB/long sit  Pt will be MI sit <>  prep for toileting LRAD  Pt will be MI toileting/toilet transfer/cloth mgmt LRAD  Pt will be IND following UE HEP in prep for self care tasks      Outcome: Not Met

## 2022-10-12 NOTE — PROGRESS NOTES
Physician Progress Note      PATIENTAltamese Half  CSN #:                  [id number]  :                       2000  ADMIT DATE:       10/7/2022 1:56 AM  DISCH DATE:  RESPONDING  PROVIDER #:        Bill Tracey MD          QUERY TEXT:    Patient admitted with right ankle fracture. Per Op note dated 10/10 documentation of debridement of right ankle to bone. To accurately reflect the procedure performed please document if debridement was excisional or nonexcisional and the deepest depth of tissue removed as down to and including: The medical record reflects the following:  Risk Factors: 25 yo male with right ankle fracture s/p ORIF, acute psychosis, acute respiratory failure  Clinical Indicators: Operative Note: Irrigation And Debridement Of Right Ankle to bone medial wound  Treatment: IV Zosyn, wound care    Thank you,  Joanne Orozco RN, CCDS  Options provided:  -- Nonexcisional debridement of muscle  -- Excisional debridement of muscle  -- Nonexcisional debridement of bone  -- Excisional debridement of bone  -- Other - I will add my own diagnosis  -- Disagree - Not applicable / Not valid  -- Disagree - Clinically unable to determine / Unknown  -- Refer to Clinical Documentation Reviewer    PROVIDER RESPONSE TEXT:    Non-excisional debridement of muscle of right ankle was performed during procedure on 10/10.     Query created by: Wing Pearl on 10/11/2022 3:11 PM      Electronically signed by:  Bill Tracey MD 10/12/2022 2:55 PM

## 2022-10-12 NOTE — PROGRESS NOTES
IMPRESSION:   Acute hypoxic respiratory failure resolved  Displaced open fracture of the right distal tibia and fibula  EtOH and polysubstance abuse  Status post open fracture right ankle irrigation and debridement application of external fixator  Hypokalemia  Anemia hemoglobin 7.3  Additional workup outlined below  Pt is at high risk of sudden decline and decompensation with life threatening consequenses and continued end organ dysfunction and failure  Pt is critically ill. Time spent with pt and staff actively rendering care, managing pt and coordinating care as stated below; 30 minutes, exclusive of any procedures      RECOMMENDATIONS/PLAN:     Extubated on 10/7  now on room air  Potassium normal  Status post staged right ankle fracture open I&D and external fixation wound VAC application  Transfuse prn to maintain Hgb > 7  Discontinue Zosyn with discharge  Labs to follow electrolytes, renal function and and blood counts  Bronchial hygiene with respiratory therapy techniques, bronchodilators  Pt needs IV fluids with additives and Drug therapy requiring intensive monitoring for toxicity  Prescription drug management with home med reconciliation reviewed  DVT, SUP prophylaxis  Clear to discharge from pulmonary perspective. [x] High complexity decision making was performed  [x] See my orders for details  HPI  22-year-old lady 4 weeks post partum came to the ED via EMS after jumping off a pillar outside of a restaurant and sustaining a open tib/fib fracture. The mother states that the patient has always been slightly off but since delivering her child has become increasingly different. The mother states that she does not speak with the family, recently sold all of her possessions, and moved into and air [facility] although the mother states that she had the option to live with multiple family members.  The mother states that she has a relative diagnosed with schizoaffective disorder and a another relative has bipolar schizophrenia. The patient was combative in the ED and leading up to the surgery at which point she was sedated and intubated. PMH:  has no past medical history on file. PSH:   has no past surgical history on file. FHX: family history includes Psychotic Disorder in her maternal uncle. SHX:  reports current alcohol use. She reports current drug use. Frequency: 7.00 times per week. Drug: Marijuana.     ALL: No Known Allergies     MEDS:   [x] Reviewed - As Below   [] Not reviewed    Current Facility-Administered Medications   Medication    acetaminophen (TYLENOL) tablet 1,000 mg    HYDROmorphone (DILAUDID) injection 1 mg    oxyCODONE IR (ROXICODONE) tablet 5 mg    oxyCODONE IR (ROXICODONE) tablet 10 mg    influenza vaccine 2022-23 (6 mos+)(PF) (FLUARIX/FLULAVAL/FLUZONE QUAD) injection 0.5 mL    0.9% sodium chloride infusion 250 mL    ondansetron (ZOFRAN) injection 4 mg    naloxone (NARCAN) injection 0.4 mg    sodium chloride (NS) flush 5-40 mL    enoxaparin (LOVENOX) injection 30 mg    piperacillin-tazobactam (ZOSYN) 3.375 g in 0.9% sodium chloride (MBP/ADV) 100 mL MBP    OLANZapine (ZyPREXA) tablet 5 mg    OLANZapine (ZyPREXA) injection 10 mg    LORazepam (ATIVAN) injection 1 mg    polyethylene glycol (MIRALAX) packet 17 g    docusate sodium (COLACE) capsule 100 mg    senna (SENOKOT) tablet 8.6 mg      MAR reviewed and pertinent medications noted or modified as needed   Current Facility-Administered Medications   Medication    acetaminophen (TYLENOL) tablet 1,000 mg    HYDROmorphone (DILAUDID) injection 1 mg    oxyCODONE IR (ROXICODONE) tablet 5 mg    oxyCODONE IR (ROXICODONE) tablet 10 mg    influenza vaccine 2022-23 (6 mos+)(PF) (FLUARIX/FLULAVAL/FLUZONE QUAD) injection 0.5 mL    0.9% sodium chloride infusion 250 mL    ondansetron (ZOFRAN) injection 4 mg    naloxone (NARCAN) injection 0.4 mg    sodium chloride (NS) flush 5-40 mL    enoxaparin (LOVENOX) injection 30 mg    piperacillin-tazobactam (ZOSYN) 3.375 g in 0.9% sodium chloride (MBP/ADV) 100 mL MBP    OLANZapine (ZyPREXA) tablet 5 mg    OLANZapine (ZyPREXA) injection 10 mg    LORazepam (ATIVAN) injection 1 mg    polyethylene glycol (MIRALAX) packet 17 g    docusate sodium (COLACE) capsule 100 mg    senna (SENOKOT) tablet 8.6 mg      PMH:  has no past medical history on file. PSH:   has no past surgical history on file. FHX: family history includes Psychotic Disorder in her maternal uncle. SHX:  reports current alcohol use. She reports current drug use. Frequency: 7.00 times per week. Drug: Marijuana. ROS:  Review of system done as mentioned in the HPI and physical exam    Hemodynamics:    CO:    CI:    CVP:    SVR:   PAP Systolic:    PAP Diastolic:    PVR:    LB56:        Ventilator Settings:      Mode Rate TV Press PEEP FiO2 PIP Min. Vent   Assist control, Volume control    460 ml    5 cm H20 35 %  19 cm H2O  7.8 l/min        Vital Signs: Telemetry:    normal sinus rhythm Intake/Output:   Visit Vitals  /77 (BP 1 Location: Right upper arm, BP Patient Position: At rest)   Pulse 77   Temp 97.8 °F (36.6 °C)   Resp 18   Ht 5' 7.01\" (1.702 m)   Wt 77.1 kg (170 lb)   SpO2 98%   Breastfeeding No Comment: Mother states not breastfeeding at this time   BMI 26.62 kg/m²       Temp (24hrs), Av.4 °F (36.9 °C), Min:97.8 °F (36.6 °C), Max:98.9 °F (37.2 °C)        O2 Device: None (Room air) O2 Flow Rate (L/min): 2 l/min       Wt Readings from Last 4 Encounters:   10/07/22 77.1 kg (170 lb)          Intake/Output Summary (Last 24 hours) at 10/12/2022 1016  Last data filed at 10/11/2022 2022  Gross per 24 hour   Intake 1001.25 ml   Output --   Net 1001.25 ml         Last shift:      No intake/output data recorded.   Last 3 shifts: 10/10 1901 - 10/12 0700  In: 4154.2 [P.O.:1080; I.V.:3074.2]  Out: 3875 [Urine:3875]       Physical Exam:     General: Alert awake on room air  HEENT: NCAT, poor dentition, lips and mucosa dry  Eyes: anicteric; conjunctiva clear  Neck: no nodes, trach midline; no accessory MM use. Chest: no deformity,   Cardiac: IR regular; no murmur;   Lungs: distant breath sounds; no wheezes  Abd: soft, NT, hypoactive BS  Ext: Right lower extremity cast with external fixator rods in place  : NO george, clear urine  Neuro: sedated with fentanyl and propofol  Psych- unable to assess  Skin: warm, dry, no cyanosis;   Pulses: 1-2+ Bilateral pedal, radial  Capillary: brisk; pale      DATA:    MAR reviewed and pertinent medications noted or modified as needed  MEDS:   Current Facility-Administered Medications   Medication    acetaminophen (TYLENOL) tablet 1,000 mg    HYDROmorphone (DILAUDID) injection 1 mg    oxyCODONE IR (ROXICODONE) tablet 5 mg    oxyCODONE IR (ROXICODONE) tablet 10 mg    influenza vaccine 2022-23 (6 mos+)(PF) (FLUARIX/FLULAVAL/FLUZONE QUAD) injection 0.5 mL    0.9% sodium chloride infusion 250 mL    ondansetron (ZOFRAN) injection 4 mg    naloxone (NARCAN) injection 0.4 mg    sodium chloride (NS) flush 5-40 mL    enoxaparin (LOVENOX) injection 30 mg    piperacillin-tazobactam (ZOSYN) 3.375 g in 0.9% sodium chloride (MBP/ADV) 100 mL MBP    OLANZapine (ZyPREXA) tablet 5 mg    OLANZapine (ZyPREXA) injection 10 mg    LORazepam (ATIVAN) injection 1 mg    polyethylene glycol (MIRALAX) packet 17 g    docusate sodium (COLACE) capsule 100 mg    senna (SENOKOT) tablet 8.6 mg        Labs:    Recent Labs     10/12/22  0459 10/10/22  0340   WBC 8.9 6.5   HGB 8.9* 7.3*    176       Recent Labs     10/10/22  0340      K 4.1   *   CO2 26   *   BUN 24*   CREA 0.74   CA 7.5*   ALB 2.4*   ALT 29       No results for input(s): PH, PCO2, PO2, HCO3, FIO2 in the last 72 hours. No results for input(s): CPK, CKNDX, TROIQ in the last 72 hours.     No lab exists for component: CPKMB  No results found for: BNPP, BNP   Lab Results   Component Value Date/Time    Culture result: No Growth (<1000 cfu/mL) 10/07/2022 09:20 AM     No results found for: TSH, TSHEXT, TSHEXT     Imaging:    Results from Hospital Encounter encounter on 10/07/22    XR CHEST PORT    Narrative  Clinical history: chf  INDICATION:   chf  COMPARISON: 10/7/2022    FINDINGS:  AP portable upright view of the chest demonstrates a stable  cardiopericardial  silhouette. There is no pleural effusion. .There is no focal consolidation. .There  is no pneumothorax. . Patient is on a cardiac monitor. Removal of ET tube. Impression  No acute process identified. 10/8 extubated yesterday now she is on room air electrolytes normal possible back to the OR soon for second procedure for distal tibia-fibula fracture which is stabilized CT of the leg stable  10/9 alert awake on room air getting pain medication possible wound closure today  10/10 awaiting for second part of the surgery  10/11 status post right ankle wound closure I&D was done yesterday, discontinue Zosyn with discharge  10/12 Patient laying in bed comfortably. S/p wound closure with I&D. Patient improved, clear to discharge from pulmonary perspective.

## 2022-10-12 NOTE — PROGRESS NOTES
IMPRESSION:   Acute hypoxic respiratory failure resolved  Displaced open fracture of the right distal tibia and fibula  EtOH and polysubstance abuse  Status post open fracture right ankle irrigation and debridement application of external fixator  Hypokalemia corrected  Anemia hemoglobin  8.9      RECOMMENDATIONS/PLAN:     Extubated on 10/7  now on room air  Potassium normal  Status post staged right ankle fracture open I&D and external fixation wound VAC application  Clear to discharge from pulmonary perspective. [x] High complexity decision making was performed  [x] See my orders for details  HPI  59-year-old lady 4 weeks post partum came to the ED via EMS after jumping off a pillar outside of a restaurant and sustaining a open tib/fib fracture. The mother states that the patient has always been slightly off but since delivering her child has become increasingly different. The mother states that she does not speak with the family, recently sold all of her possessions, and moved into and air [facility] although the mother states that she had the option to live with multiple family members. The mother states that she has a relative diagnosed with schizoaffective disorder and a another relative has bipolar schizophrenia. The patient was combative in the ED and leading up to the surgery at which point she was sedated and intubated. PMH:  has no past medical history on file. PSH:   has no past surgical history on file. FHX: family history includes Psychotic Disorder in her maternal uncle. SHX:  reports current alcohol use. She reports current drug use. Frequency: 7.00 times per week. Drug: Marijuana.     ALL: No Known Allergies     MEDS:   [x] Reviewed - As Below   [] Not reviewed    Current Facility-Administered Medications   Medication    acetaminophen (TYLENOL) tablet 1,000 mg    HYDROmorphone (DILAUDID) injection 1 mg    oxyCODONE IR (ROXICODONE) tablet 5 mg    oxyCODONE IR (ROXICODONE) tablet 10 mg influenza vaccine 2022-23 (6 mos+)(PF) (FLUARIX/FLULAVAL/FLUZONE QUAD) injection 0.5 mL    0.9% sodium chloride infusion 250 mL    ondansetron (ZOFRAN) injection 4 mg    naloxone (NARCAN) injection 0.4 mg    sodium chloride (NS) flush 5-40 mL    enoxaparin (LOVENOX) injection 30 mg    piperacillin-tazobactam (ZOSYN) 3.375 g in 0.9% sodium chloride (MBP/ADV) 100 mL MBP    OLANZapine (ZyPREXA) tablet 5 mg    OLANZapine (ZyPREXA) injection 10 mg    LORazepam (ATIVAN) injection 1 mg    polyethylene glycol (MIRALAX) packet 17 g    docusate sodium (COLACE) capsule 100 mg    senna (SENOKOT) tablet 8.6 mg      MAR reviewed and pertinent medications noted or modified as needed   Current Facility-Administered Medications   Medication    acetaminophen (TYLENOL) tablet 1,000 mg    HYDROmorphone (DILAUDID) injection 1 mg    oxyCODONE IR (ROXICODONE) tablet 5 mg    oxyCODONE IR (ROXICODONE) tablet 10 mg    influenza vaccine 2022-23 (6 mos+)(PF) (FLUARIX/FLULAVAL/FLUZONE QUAD) injection 0.5 mL    0.9% sodium chloride infusion 250 mL    ondansetron (ZOFRAN) injection 4 mg    naloxone (NARCAN) injection 0.4 mg    sodium chloride (NS) flush 5-40 mL    enoxaparin (LOVENOX) injection 30 mg    piperacillin-tazobactam (ZOSYN) 3.375 g in 0.9% sodium chloride (MBP/ADV) 100 mL MBP    OLANZapine (ZyPREXA) tablet 5 mg    OLANZapine (ZyPREXA) injection 10 mg    LORazepam (ATIVAN) injection 1 mg    polyethylene glycol (MIRALAX) packet 17 g    docusate sodium (COLACE) capsule 100 mg    senna (SENOKOT) tablet 8.6 mg      PMH:  has no past medical history on file. PSH:   has no past surgical history on file. FHX: family history includes Psychotic Disorder in her maternal uncle. SHX:  reports current alcohol use. She reports current drug use. Frequency: 7.00 times per week. Drug: Marijuana.      ROS:  Review of system done as mentioned in the HPI and physical exam    Hemodynamics:    CO:    CI:    CVP:    SVR:   PAP Systolic:    PAP Diastolic: PVR:    SV02:        Ventilator Settings:      Mode Rate TV Press PEEP FiO2 PIP Min. Vent   Assist control, Volume control    460 ml    5 cm H20 35 %  19 cm H2O  7.8 l/min        Vital Signs: Telemetry:    normal sinus rhythm Intake/Output:   Visit Vitals  /77 (BP 1 Location: Right upper arm, BP Patient Position: At rest)   Pulse 77   Temp 97.8 °F (36.6 °C)   Resp 18   Ht 5' 7.01\" (1.702 m)   Wt 77.1 kg (170 lb)   SpO2 98%   Breastfeeding No Comment: Mother states not breastfeeding at this time   BMI 26.62 kg/m²       Temp (24hrs), Av.4 °F (36.9 °C), Min:97.8 °F (36.6 °C), Max:98.9 °F (37.2 °C)        O2 Device: None (Room air) O2 Flow Rate (L/min): 2 l/min       Wt Readings from Last 4 Encounters:   10/07/22 77.1 kg (170 lb)          Intake/Output Summary (Last 24 hours) at 10/12/2022 1020  Last data filed at 10/11/2022 2022  Gross per 24 hour   Intake 1001.25 ml   Output --   Net 1001.25 ml         Last shift:      No intake/output data recorded. Last 3 shifts: 10/10 1901 - 10/12 0700  In: 4154.2 [P.O.:1080; I.V.:3074.2]  Out: 3875 [Urine:3875]       Physical Exam:     General: Alert awake on room air  HEENT: NCAT, poor dentition, lips and mucosa dry  Eyes: anicteric; conjunctiva clear  Neck: no nodes, trach midline; no accessory MM use.   Chest: no deformity,   Cardiac: IR regular; no murmur;   Lungs: distant breath sounds; no wheezes  Abd: soft, NT, hypoactive BS  Ext: Right lower extremity cast with external fixator rods in place  : NO george, clear urine  Neuro: sedated with fentanyl and propofol  Psych- unable to assess  Skin: warm, dry, no cyanosis;   Pulses: 1-2+ Bilateral pedal, radial  Capillary: brisk; pale      DATA:    MAR reviewed and pertinent medications noted or modified as needed  MEDS:   Current Facility-Administered Medications   Medication    acetaminophen (TYLENOL) tablet 1,000 mg    HYDROmorphone (DILAUDID) injection 1 mg    oxyCODONE IR (ROXICODONE) tablet 5 mg    oxyCODONE IR (ROXICODONE) tablet 10 mg    influenza vaccine 2022-23 (6 mos+)(PF) (FLUARIX/FLULAVAL/FLUZONE QUAD) injection 0.5 mL    0.9% sodium chloride infusion 250 mL    ondansetron (ZOFRAN) injection 4 mg    naloxone (NARCAN) injection 0.4 mg    sodium chloride (NS) flush 5-40 mL    enoxaparin (LOVENOX) injection 30 mg    piperacillin-tazobactam (ZOSYN) 3.375 g in 0.9% sodium chloride (MBP/ADV) 100 mL MBP    OLANZapine (ZyPREXA) tablet 5 mg    OLANZapine (ZyPREXA) injection 10 mg    LORazepam (ATIVAN) injection 1 mg    polyethylene glycol (MIRALAX) packet 17 g    docusate sodium (COLACE) capsule 100 mg    senna (SENOKOT) tablet 8.6 mg        Labs:    Recent Labs     10/12/22  0459 10/10/22  0340   WBC 8.9 6.5   HGB 8.9* 7.3*    176       Recent Labs     10/10/22  0340      K 4.1   *   CO2 26   *   BUN 24*   CREA 0.74   CA 7.5*   ALB 2.4*   ALT 29       No results for input(s): PH, PCO2, PO2, HCO3, FIO2 in the last 72 hours. No results for input(s): CPK, CKNDX, TROIQ in the last 72 hours. No lab exists for component: CPKMB  No results found for: BNPP, BNP   Lab Results   Component Value Date/Time    Culture result: No Growth (<1000 cfu/mL) 10/07/2022 09:20 AM     No results found for: TSH, TSHEXT, TSHEXT     Imaging:    Results from Hospital Encounter encounter on 10/07/22    XR CHEST PORT    Narrative  Clinical history: chf  INDICATION:   chf  COMPARISON: 10/7/2022    FINDINGS:  AP portable upright view of the chest demonstrates a stable  cardiopericardial  silhouette. There is no pleural effusion. .There is no focal consolidation. .There  is no pneumothorax. . Patient is on a cardiac monitor. Removal of ET tube. Impression  No acute process identified.       10/8 extubated yesterday now she is on room air electrolytes normal possible back to the OR soon for second procedure for distal tibia-fibula fracture which is stabilized CT of the leg stable  10/9 alert awake on room air getting pain medication possible wound closure today  10/10 awaiting for second part of the surgery  10/11 status post right ankle wound closure I&D was done yesterday, discontinue Zosyn with discharge  10/12 Patient laying in bed comfortably. S/p wound closure with I&D. Patient improved, clear to discharge from pulmonary perspective.

## 2022-10-12 NOTE — PROGRESS NOTES
Problem: Mobility Impaired (Adult and Pediatric)  Goal: *Acute Goals and Plan of Care (Insert Text)  Description: FUNCTIONAL STATUS PRIOR TO ADMISSION: Patient was independent and active without use of DME.    HOME SUPPORT PRIOR TO ADMISSION: The patient lived alone with no local support. Physical Therapy Goals  Initiated 10/8/2022  Pt stated goal: to get better  Pt will be I with LE HEP in 7 days. Pt will perform bed mobility with mod I in 7 days. Pt will perform transfers with mod I in 7 days. Pt will amb 25-50 feet with LRAD safely with mod I in 7 days. Outcome: Progressing Towards Goal   PHYSICAL THERAPY TREATMENT  Patient: Villa Conley (85 y.o. female)  Date: 10/12/2022  Diagnosis: Open right ankle fracture [S82.891B] <principal problem not specified>  Procedure(s) (LRB):  Irrigation And Debridement Of Right Ankle, Wound Closure (Right) 2 Days Post-Op  Precautions:  NWB R LE  Chart, physical therapy assessment, plan of care and goals were reviewed. ASSESSMENT  Patient continues with skilled PT services and is progressing towards goals. Pt received semi supine  upon PT arrival, agreeable to session. (See below for objective details and assist levels). Overall pt tolerated session good  today. Pt. Able to perform bed mobility Mod I. Pt. Needed vcs for proper hand placement while standing instead of pulling up on walker. Pt. Increased gt. Distance and endurance this session. Pt. Maintained NWB R LE. Gt. Steady with No LOB noted. Pt. Required 2 standing rest breaks due to fatigue. Pt. Able to ascend/descend stairs with bilat railing and CGA X 1. Left pt. In recliner chair with R LE elevated. Will continue to benefit from skilled PT services, and will continue to progress as tolerated.      Current Level of Function Impacting Discharge (mobility/balance): SBA Mobility    Other factors to consider for discharge: safety/family assist at home, PLOF,external fixator         PLAN :  Patient continues to benefit from skilled intervention to address the above impairments. Continue treatment per established plan of care to address goals. Recommend with staff: Out of bed to chair for meals, Encourage HEP in prep for ADLs/mobility, Frequent repositioning to prevent skin breakdown, Amb to bathroom for toileting with gt belt and AD, and LE elevation for management of edema    Recommendation for discharge: (in order for the patient to meet his/her long term goals)  Home with [address] [place] [address]    This discharge recommendation:  Has been made in collaboration with the attending provider and/or case management    IF patient discharges home will need the following DME: rolling walker and 3:1 commode       SUBJECTIVE:   Patient stated Renea Dillon will have help at home, I'm going to my parents house. \"    OBJECTIVE DATA SUMMARY:   Critical Behavior:  Neurologic State: Alert  Orientation Level: Oriented X4  Cognition: Appropriate decision making, Appropriate for age attention/concentration, Appropriate safety awareness, Follows commands     Functional Mobility Training:  Bed Mobility:  Rolling: Modified independent  Supine to Sit: Modified independent     Scooting: Modified independent    Discussed questions and concerns from pt.--Educated pt. With Tfs, using shower chair,exercises stair training, and getting in and out of car,sitting in chair throughout day      Transfers:  Sit to Stand: Stand-by assistance  Stand to Sit: Stand-by assistance        Bed to Chair: Stand-by assistance   Pt. Ambulated to bathroom after gt. Pt. Tf on and off commode with SBA and vcs for proper hand placement Pt. Able to perform kristi care and washed hands at sink. Balance:  Sitting: Intact; Without support  Standing: Intact; With support  Standing - Static: Constant support;Good  Standing - Dynamic : Constant support;Good  Ambulation/Gait Training:  Distance (ft): 125 Feet (ft)  Assistive Device: Walker, rolling  Ambulation - Level of Assistance: Stand-by assistance        Right Side Weight Bearing: Non-weight bearing        Stairs:  Number of Stairs Trained: 4 (Pt. stated has 2 steps to enter home; and stays on first floor)  Stairs - Level of Assistance: Contact guard assistance   Rail Use: Both                Pt. Reported she has a platform surface she can use for going up/down 2 steps. Demonstrated/explained  bumping up steps if needed to get into home   Pt. Wanted to use RW for ambulation and not crutches. Pt. Stated she was not comfortable with them. Therapeutic Exercises:       EXERCISE   Sets   Reps   Active Active Assist   Passive Self ROM   Comments   Ankle Pumps  20 [x] [] [] [] L LE   Quad Sets  15 [x] [] [] [] R LE   Hamstring Sets   [] [] [] []    Short Arc Quads   [] [] [] []    Heel Slides   [] [] [] []    Straight Leg Raises   [] [] [] []    Hip abd/add  15 [x] [] [] []    Long Arc Quads  15 [x] [] [] [] Partial range on R LE/ full range L LE   Marching  15 [x] [] [] [] L LE      [] [] [] []       Pain Ratin/10 R ankle      Activity Tolerance:   Good    After treatment patient left in no apparent distress:   Bed locked and returned to lowest position, Sitting in chair, Call bell within reach, and RN notified of session. COMMUNICATION/COLLABORATION:   The patients plan of care was discussed with: Registered nurse.      Yudy Cisneros, PT   Time Calculation: 56 mins

## 2022-10-12 NOTE — PROGRESS NOTES
Orthopedic progress note    Date:10/12/2022       Room:[facility]  Patient [address]     Date of Birth:[birth year]     Age:21 y.o. Subjective    22-year-old female status post repeat I&D with secondary wound closure right ankle. Postop day #2. Patient doing well. She is comfortable. Pain is well controlled. No other complaints at this time.   Objective           Vitals Last 24 Hours:  TEMPERATURE:  Temp  Av.4 °F (36.9 °C)  Min: 97.8 °F (36.6 °C)  Max: 98.9 °F (37.2 °C)  RESPIRATIONS RANGE: Resp  Av.7  Min: 18  Max: 20  PULSE OXIMETRY RANGE: SpO2  Av.3 %  Min: 96 %  Max: 98 %  PULSE RANGE: Pulse  Av.8  Min: 76  Max: 86  BLOOD PRESSURE RANGE: Systolic (47UVY), WPK:113 , Min:122 , QLR:046   ; Diastolic (42KQP), MAGDALENA:80, Min:73, Max:86    Current Facility-Administered Medications   Medication Dose Route Frequency    acetaminophen (TYLENOL) tablet 1,000 mg  1,000 mg Oral Q6H    HYDROmorphone (DILAUDID) injection 1 mg  1 mg IntraVENous Q6H PRN    oxyCODONE IR (ROXICODONE) tablet 5 mg  5 mg Oral Q4H PRN    oxyCODONE IR (ROXICODONE) tablet 10 mg  10 mg Oral Q4H PRN    influenza vaccine  (6 mos+)(PF) (FLUARIX/FLULAVAL/FLUZONE QUAD) injection 0.5 mL  1 Each IntraMUSCular PRIOR TO DISCHARGE    0.9% sodium chloride infusion 250 mL  250 mL IntraVENous PRN    ondansetron (ZOFRAN) injection 4 mg  4 mg IntraVENous PRN    naloxone (NARCAN) injection 0.4 mg  0.4 mg IntraVENous PRN    sodium chloride (NS) flush 5-40 mL  5-40 mL IntraVENous PRN    enoxaparin (LOVENOX) injection 30 mg  30 mg SubCUTAneous Q24H    piperacillin-tazobactam (ZOSYN) 3.375 g in 0.9% sodium chloride (MBP/ADV) 100 mL MBP  3.375 g IntraVENous Q8H    OLANZapine (ZyPREXA) tablet 5 mg  5 mg Oral Q12H    OLANZapine (ZyPREXA) injection 10 mg  10 mg IntraMUSCular Q8H PRN    LORazepam (ATIVAN) injection 1 mg  1 mg IntraVENous Q4H PRN    polyethylene glycol (MIRALAX) packet 17 g  17 g Oral DAILY    docusate sodium (COLACE) capsule 100 mg  100 mg Oral BID PRN    senna (SENOKOT) tablet 8.6 mg  1 Tablet Oral DAILY       Review of Systems   Constitutional: Negative for malaise/fatigue. Respiratory: Negative for cough, shortness of breath and wheezing. Cardiovascular: Negative for chest pain and palpitations. Gastrointestinal: Negative for abdominal pain, heartburn, nausea and vomiting. Neurological: Negative for headaches. Musculoskeletal: Denies any numbness/tingling of operative extremity     I/O (24Hr): Intake/Output Summary (Last 24 hours) at 10/12/2022 1613  Last data filed at 10/11/2022 2022  Gross per 24 hour   Intake 240 ml   Output --   Net 240 ml       Objective:  Vital signs: (most recent): Blood pressure 128/77, pulse 80, temperature 98.4 °F (36.9 °C), resp. rate 19, height 5' 7.01\" (1.702 m), weight 77.1 kg (170 lb), SpO2 98 %, not currently breastfeeding. Labs/Imaging/Diagnostics    Labs:  CBC:  Recent Labs     10/12/22  0459 10/10/22  0340   WBC 8.9 6.5   RBC 3.58* 3.04*   HGB 8.9* 7.3*   HCT 27.6* 23.5*   MCV 77.1* 77.3*   RDW 17.8* 18.1*    176       CHEMISTRIES:  Recent Labs     10/10/22  0340      K 4.1   *   CO2 26   BUN 24*   CREA 0.74   CA 7.5*     PT/INR:No results for input(s): INR, INREXT, INREXT in the last 72 hours. No lab exists for component: PROTIME  APTT:No results for input(s): APTT in the last 72 hours. LIVER PROFILE:  Recent Labs     10/10/22  0340   AST 24   ALT 29       Lab Results   Component Value Date/Time    ALT (SGPT) 29 10/10/2022 03:40 AM    AST (SGOT) 24 10/10/2022 03:40 AM    Alk. phosphatase 56 10/10/2022 03:40 AM    Bilirubin, total 0.1 (L) 10/10/2022 03:40 AM       Physical Exam:  Right lower extremity: Dressing clean dry and intact. External fixator in place. Mild swelling seen in right foot. Cap refill less than 2 seconds. Good range of motion toes right foot with minimal discomfort. DP pulses palpable. No calf pain to palpation.   Right lower extremity appears neurovascularly intact. Assessment//Plan           Patient Active Problem List    Diagnosis Date Noted    Open right ankle fracture 10/07/2022     Status post repeat I&D with secondary wound closure right ankle. Postop day #2. Discussed with general surgery team.  Patient being transferred to orthopedic service. Okay to discharge patient from ICU. Patient will ambulate with therapy nonweightbearing right lower extremity. Acute postop anemia. Asymptomatic. Continue to monitor. Recheck H&H in the morning. Patient has done well with physical therapy today with crutches and doing stairs. Patient is awaiting case management to see if all the arrangements have been done for discharge home. Dressing change has been carried out. Nursing team will call me if patient is okay for discharge for discharge order and a prescription for oxycodone. Patient will discharge for external fixator follow-up with Dr. Jluis Aguilar as outpatient in approximately 7 to 10 days. Anticipate patient be ready for discharge by tomorrow.       Electronically signed by Lyndal Sandifer, MD on 10/12/2022 at 3:21 PM

## 2022-10-13 VITALS
HEART RATE: 84 BPM | DIASTOLIC BLOOD PRESSURE: 75 MMHG | OXYGEN SATURATION: 97 % | TEMPERATURE: 97.8 F | BODY MASS INDEX: 26.68 KG/M2 | RESPIRATION RATE: 16 BRPM | HEIGHT: 67 IN | SYSTOLIC BLOOD PRESSURE: 128 MMHG | WEIGHT: 170 LBS

## 2022-10-13 PROCEDURE — 74011250637 HC RX REV CODE- 250/637: Performed by: ORTHOPAEDIC SURGERY

## 2022-10-13 PROCEDURE — 74011250636 HC RX REV CODE- 250/636: Performed by: ORTHOPAEDIC SURGERY

## 2022-10-13 PROCEDURE — 90471 IMMUNIZATION ADMIN: CPT

## 2022-10-13 PROCEDURE — 74011250637 HC RX REV CODE- 250/637: Performed by: PHYSICIAN ASSISTANT

## 2022-10-13 PROCEDURE — 74011000258 HC RX REV CODE- 258: Performed by: ORTHOPAEDIC SURGERY

## 2022-10-13 PROCEDURE — 90686 IIV4 VACC NO PRSV 0.5 ML IM: CPT | Performed by: ORTHOPAEDIC SURGERY

## 2022-10-13 PROCEDURE — 74011000636 HC RX REV CODE- 636: Performed by: ORTHOPAEDIC SURGERY

## 2022-10-13 PROCEDURE — 97535 SELF CARE MNGMENT TRAINING: CPT

## 2022-10-13 PROCEDURE — 97116 GAIT TRAINING THERAPY: CPT

## 2022-10-13 RX ORDER — DICLOFENAC SODIUM 50 MG/1
50 TABLET, DELAYED RELEASE ORAL 2 TIMES DAILY
Qty: 10 TABLET | Refills: 0 | Status: SHIPPED | OUTPATIENT
Start: 2022-10-13 | End: 2022-10-18

## 2022-10-13 RX ORDER — OXYCODONE HYDROCHLORIDE 5 MG/1
5 TABLET ORAL
Qty: 10 TABLET | Refills: 0 | Status: SHIPPED | OUTPATIENT
Start: 2022-10-13 | End: 2022-10-16

## 2022-10-13 RX ORDER — OLANZAPINE 5 MG/1
5 TABLET ORAL EVERY 12 HOURS
Qty: 42 TABLET | Refills: 0 | Status: SHIPPED | OUTPATIENT
Start: 2022-10-13 | End: 2022-11-03

## 2022-10-13 RX ORDER — AMOXICILLIN 250 MG
1 CAPSULE ORAL
Qty: 15 TABLET | Refills: 0 | Status: SHIPPED | OUTPATIENT
Start: 2022-10-13

## 2022-10-13 RX ORDER — ACETAMINOPHEN 500 MG
1000 TABLET ORAL
Qty: 30 TABLET | Refills: 0 | Status: SHIPPED
Start: 2022-10-13

## 2022-10-13 RX ORDER — CEPHALEXIN 500 MG/1
500 CAPSULE ORAL 4 TIMES DAILY
Qty: 12 CAPSULE | Refills: 0 | Status: SHIPPED | OUTPATIENT
Start: 2022-10-13 | End: 2022-10-16

## 2022-10-13 RX ORDER — GUAIFENESIN 100 MG/5ML
81 LIQUID (ML) ORAL DAILY
Qty: 7 TABLET | Refills: 0 | Status: SHIPPED | OUTPATIENT
Start: 2022-10-13 | End: 2022-10-20

## 2022-10-13 RX ADMIN — ACETAMINOPHEN 1000 MG: 500 TABLET ORAL at 12:30

## 2022-10-13 RX ADMIN — PIPERACILLIN AND TAZOBACTAM 3.38 G: 3; .375 INJECTION, POWDER, FOR SOLUTION INTRAVENOUS at 06:31

## 2022-10-13 RX ADMIN — DOCUSATE SODIUM 100 MG: 100 CAPSULE, LIQUID FILLED ORAL at 10:34

## 2022-10-13 RX ADMIN — OLANZAPINE 5 MG: 10 TABLET, FILM COATED ORAL at 10:34

## 2022-10-13 RX ADMIN — INFLUENZA VIRUS VACCINE 0.5 ML: 15; 15; 15; 15 SUSPENSION INTRAMUSCULAR at 17:02

## 2022-10-13 RX ADMIN — ACETAMINOPHEN 1000 MG: 500 TABLET ORAL at 01:11

## 2022-10-13 RX ADMIN — ACETAMINOPHEN 1000 MG: 500 TABLET ORAL at 06:32

## 2022-10-13 RX ADMIN — ENOXAPARIN SODIUM 30 MG: 100 INJECTION SUBCUTANEOUS at 15:57

## 2022-10-13 RX ADMIN — OXYCODONE HYDROCHLORIDE 10 MG: 10 TABLET ORAL at 02:33

## 2022-10-13 RX ADMIN — OXYCODONE 5 MG: 5 TABLET ORAL at 17:01

## 2022-10-13 RX ADMIN — POLYETHYLENE GLYCOL 3350 17 G: 17 POWDER, FOR SOLUTION ORAL at 10:34

## 2022-10-13 RX ADMIN — SENNOSIDES 8.6 MG: 8.6 TABLET, COATED ORAL at 10:34

## 2022-10-13 RX ADMIN — OXYCODONE HYDROCHLORIDE 10 MG: 10 TABLET ORAL at 12:31

## 2022-10-13 RX ADMIN — PIPERACILLIN AND TAZOBACTAM 3.38 G: 3; .375 INJECTION, POWDER, FOR SOLUTION INTRAVENOUS at 15:56

## 2022-10-13 RX ADMIN — OXYCODONE HYDROCHLORIDE 10 MG: 10 TABLET ORAL at 06:31

## 2022-10-13 NOTE — PROGRESS NOTES
Pt has a discharge order in for today. Pt has been cleared by attending provider and all consults. Pt is being discharged home with home health. Pt is not being discharged with an IV, drain, george, or telemetry. Pt vital signs stable and she shows no signs of distress. External fixator in place and dressing was changed 10/12/22. Discharge plan of care/case management plan validated with provider discharge order.

## 2022-10-13 NOTE — PROGRESS NOTES
Patient has been accepted by [facility] [address]. Contact information: [address] [place] ([phone]). SOC: 10/14/2022. A rolling walker has been ordered through [facility] and the patient can pick it up at their store on the way home. Patient is clear to discharge home with home health services and rolling walker. Nurse is aware. Discharge plan of care/case management plan validated with provider discharge order.
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
## 2022-10-13 NOTE — DISCHARGE SUMMARY
Orthopedic progress note    Date:10/13/2022       Room:[facility]  Patient Cheryl Graham     Date of Birth:[birth year]     Age:21 y.o. Subjective    Status post repeat I&D with secondary wound closure right ankle. Postop day #3. Patient lying comfortably in bed. Doing well. No complaints of pain at this time. She is ambulating well with therapy. No other complaints at this time.   Objective           Vitals Last 24 Hours:  TEMPERATURE:  Temp  Av °F (36.7 °C)  Min: 97.5 °F (36.4 °C)  Max: 98.9 °F (37.2 °C)  RESPIRATIONS RANGE: Resp  Av.8  Min: 16  Max: 18  PULSE OXIMETRY RANGE: SpO2  Av.5 %  Min: 96 %  Max: 99 %  PULSE RANGE: Pulse  Av.8  Min: 77  Max: 87  BLOOD PRESSURE RANGE: Systolic (83PTN), TV , Min:120 , SBF:751   ; Diastolic (07ZGW), YOT:87, Min:64, Max:86    Current Facility-Administered Medications   Medication Dose Route Frequency    acetaminophen (TYLENOL) tablet 1,000 mg  1,000 mg Oral Q6H    HYDROmorphone (DILAUDID) injection 1 mg  1 mg IntraVENous Q6H PRN    oxyCODONE IR (ROXICODONE) tablet 5 mg  5 mg Oral Q4H PRN    oxyCODONE IR (ROXICODONE) tablet 10 mg  10 mg Oral Q4H PRN    influenza vaccine  (6 mos+)(PF) (FLUARIX/FLULAVAL/FLUZONE QUAD) injection 0.5 mL  1 Each IntraMUSCular PRIOR TO DISCHARGE    0.9% sodium chloride infusion 250 mL  250 mL IntraVENous PRN    ondansetron (ZOFRAN) injection 4 mg  4 mg IntraVENous PRN    naloxone (NARCAN) injection 0.4 mg  0.4 mg IntraVENous PRN    sodium chloride (NS) flush 5-40 mL  5-40 mL IntraVENous PRN    enoxaparin (LOVENOX) injection 30 mg  30 mg SubCUTAneous Q24H    piperacillin-tazobactam (ZOSYN) 3.375 g in 0.9% sodium chloride (MBP/ADV) 100 mL MBP  3.375 g IntraVENous Q8H    OLANZapine (ZyPREXA) tablet 5 mg  5 mg Oral Q12H    OLANZapine (ZyPREXA) injection 10 mg  10 mg IntraMUSCular Q8H PRN    LORazepam (ATIVAN) injection 1 mg  1 mg IntraVENous Q4H PRN    polyethylene glycol (MIRALAX) packet 17 g  17 g Oral DAILY docusate sodium (COLACE) capsule 100 mg  100 mg Oral BID PRN    senna (SENOKOT) tablet 8.6 mg  1 Tablet Oral DAILY          I/O (24Hr): No intake or output data in the 24 hours ending 10/13/22 0855  Objective:  Vital signs: (most recent): Blood pressure 127/73, pulse 81, temperature 98.8 °F (37.1 °C), resp. rate 16, height 5' 7.01\" (1.702 m), weight 77.1 kg (170 lb), SpO2 100 %, not currently breastfeeding. Labs/Imaging/Diagnostics    Labs:  CBC:  Recent Labs     10/12/22  0459   WBC 8.9   RBC 3.58*   HGB 8.9*   HCT 27.6*   MCV 77.1*   RDW 17.8*        CHEMISTRIES:No results for input(s): NA, K, CL, CO2, BUN, CREA, CA, PHOS, MG in the last 72 hours. No lab exists for component: GLUCOSEPT/INR:No results for input(s): INR, INREXT in the last 72 hours. No lab exists for component: PROTIME  APTT:No results for input(s): APTT in the last 72 hours. LIVER PROFILE:No results for input(s): AST, ALT in the last 72 hours. No lab exists for component: Dannial Dance, ALKPHOS  Lab Results   Component Value Date/Time    ALT (SGPT) 29 10/10/2022 03:40 AM    AST (SGOT) 24 10/10/2022 03:40 AM    Alk. phosphatase 56 10/10/2022 03:40 AM    Bilirubin, total 0.1 (L) 10/10/2022 03:40 AM       Physical Exam:  Right lower extremity: External fixator in good position. Pin sites are clean dry and intact. Dressings clean and dry. Minimal swelling seen of right foot. DP pulses palpable. Cap refill is 2 seconds. Good range of motion of toes of right foot with minimal discomfort. No calf pain to palpation. Right lower extremity neurovascularly intact. Assessment//Plan           Patient Active Problem List    Diagnosis Date Noted    Open right ankle fracture 10/07/2022     Status post repeat I&D with secondary wound closure right ankle. Postop day #3. Will discharge home today.   If home health is not available for wound care will show patient's family how to perform 1 dressing change prior to next office visit with Dr. Venita Guo. Patient will follow up with Dr. Venita Guo in approximately 1 week. Patient was seen today face-to-face and will require skilled nursing for care.     Electronically signed by Tri Parson PA-C on 10/13/2022 at 8:55 AM
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
## 2022-10-13 NOTE — PROGRESS NOTES
PEDIATRIC ILLNESS VISIT     SUBJECTIVE  Accompanied by:  mother  Hilary Pereira is a 11 year old who is complaining of a vaginal rash.  She has now had 2 periods, but times mainly with spotting. This time, her period was from 5/27-6/1 and on started 6/5 she noticed some itching/burning to her vaginal area. They are not sure if it is from something that touched her skin that she became irritated from - They had used pantyliners, a pad one time, no new soaps. She did use a feminine wash but the symptoms started before that.  Present for 1 week and is worsening.  Present treatments: vagisil, cortisone cream which helped, A&D ointment. Previous medical contacts for the problem - none.. She has been swimming so they weren't sure if the chlorine affected it.     Symptoms:  Fever: no fever  General: Eating well, sleeping well    ROS  All other ROS negative unless indicated otherwise above.      Allergies:  ALLERGIES:  No Known Allergies    Current Outpatient Medications   Medication Sig Dispense Refill   • fluconazole (DIFLUCAN) 150 MG tablet Take 1 tablet by mouth 1 time for 1 dose. 1 tablet 0   • amoxicillin-clavulanate (Augmentin) 875-125 MG per tablet 1 tab twice a day for 10 days 20 tablet 0     No current facility-administered medications for this visit.     Family history:   No other family members have acute illness     Social history:   Lives at home    OBJECTIVE:   Physical exam  Visit Vitals  Pulse 88   Temp 97.1 °F (36.2 °C) (Temporal)   Resp 18   Wt 61 kg (134 lb 6.4 oz)       GENERAL:  Well nourished, alert, active, consolable, healthy-appearing child.  HYDRATION: Well hydrated, moist mucous membranes and normal skin turgor.  :  Rodger stage 5 female; Intense erythema to vaginal area which has spread to upper thighs, nonvesicular, just intense redness, slightly warm to touch, tender    ASSESSMENT/PLAN  1. Erythema of vagina        Orders Placed This Encounter   • DISCONTD: amoxicillin-clavulanate  CM met with patient to get choice for home health company. She has no preference. Choice letter completed and referrals sent via [employer]. 34/35 home health [facility] declined patient. [facility] is reviewing her clinicals and will get send their decision accordingly. CM left a message for patient's mother, Clayton Andrew ([phone]) to get information on a second health insurance patient has. Awaiting a call back. Patient has a rolling walker to  [facility] upon discharge. CM will continue to follow. (Augmentin) 875-125 MG per tablet   • fluconazole (DIFLUCAN) 150 MG tablet   • amoxicillin-clavulanate (Augmentin) 875-125 MG per tablet     Unclear etiology of the erythema.  I did discuss with family that a yeast infection is on the differential versus contact dermatitis versus an allergic reaction versus cellulitis.  We will start her on Augmentin, fluconazole, and 1% hydrocortisone cream topically at this time.  I did encourage them to outline the areas on her thighs with a pen to see if there is improvement over the weekend.  If there is no improvement in her symptoms over the next few days, would consider having her see OB/GYN.  We discussed if the steroid may be beneficial, but will hold off at this time.    They were encouraged to call, return to the office, or go to the ER if there are any further questions or concerns or any worsening symptoms.     Medication changes: Yes  Immunizations given today? No.  See Orders:  Instructed to call if the problem worsens or does not improve within the next 24 to 48 hours.  Schedule follow-up: JACKIE Hidalgo MD    Total length of visit was 30 minutes with greater than 50% spent in counseling

## 2022-10-13 NOTE — PROGRESS NOTES
CM received a phone call back from patient's mother. She provided insurance information: Junior Chava Clayton ID: [id number] Group #: [id number]. CM contacted registration to put information into patient's record.

## 2022-10-13 NOTE — ROUTINE PROCESS
Bedside, verbal shift change report given to Fahad Navas RN (oncoming nurse) by Silvina Santoyo RN (offgoing nurse). Report included the following information SBAR and night shift events.

## 2022-10-13 NOTE — DISCHARGE INSTRUCTIONS
Deanna Bermudez, [facility], [address] [place] [address] [phone] - Call for outpatient behavioral health [facility] to begin on 10/14//22   Contact information: Gonzalez Yuen ([phone]).

## 2022-10-13 NOTE — PROGRESS NOTES
OCCUPATIONAL THERAPY TREATMENT  Patient: Rick Antonio (38 y.o. female)  Date: 10/13/2022  Diagnosis: Open right ankle fracture [S82.891B] <principal problem not specified>  Procedure(s) (LRB):  Irrigation And Debridement Of Right Ankle, Wound Closure (Right) 3 Days Post-Op  Precautions: FALL  Chart, occupational therapy assessment, plan of care, and goals were reviewed. ASSESSMENT  Pt continues with skilled OT services and is progressing towards goals. Pt received semi-supine in bed upon arrival, AXO x4 and agreeable to CARLSON tx at this time. Pt cooperative and demonstrated good effort during activities. Pt tolerated session fairly. Overall, pt continues to present with deficits in generalized strength/AROM,dynamic standing balance and functional activity tolerance during performance of ADLs/mobility (see below for objective details and assist levels). Pt currently SBA for bed mobility>EOB, toileting tf and toileting hygiene and standing at sink for grooming. Pt stood at sink for ~ 7 minutes with good standing balance. Pt presented no LOB using RW. Vc's provided to slow pace with tf's to decrease falls. Education provided on AE such as reacher for increased ADL safety and independence. Discussed with pt to wear loose clothing that will go over fixator on the R LE. Pt verbalized good understanding of concepts taught. Pt reported pain in R LE at a 7/10 at rest and with movement. Recommend d/c to [facility] with family care once medically appropriate. Other factors to consider for discharge: Time of onset, medical prognosis/diagnosis, severity of deficits, PLOF, functional baseline, home environment, and family support          PLAN :  Patient continues to benefit from skilled intervention to address the above impairments. Continue treatment per established plan of care. to address goals.       Recommendation for discharge: (in order for the patient to meet his/her long term goals)  HHOT with family care    This discharge recommendation:  Has been made in collaboration with the attending provider and/or case management       IF patient discharges home will need the following DME: TBD       SUBJECTIVE:   Patient stated I have some pain in the leg.     OBJECTIVE DATA SUMMARY:   Cognitive/Behavioral Status:  Neurologic State: Alert  Orientation Level: Appropriate for age  Cognition: Appropriate decision making             Functional Mobility and Transfers for ADLs:  Bed Mobility:  Rolling: Modified independent  Supine to Sit: Modified independent  Sit to Supine: Modified independent  Scooting: Modified independent    Transfers:  Sit to Stand: Stand-by assistance  Functional Transfers  Bathroom Mobility: Stand-by assistance  Toilet Transfer : Stand-by assistance  Bed to Chair: Stand-by assistance    Balance:  Sitting: Intact  Standing: Intact; With support  Standing - Static: Constant support;Good  Standing - Dynamic : Constant support;Good    ADL Intervention:       Grooming  Grooming Assistance: Stand-by assistance  Position Performed: Standing  Washing Face: Stand-by assistance  Washing Hands: Stand-by assistance  Brushing Teeth: Stand-by assistance         Toileting  Toileting Assistance: Stand-by assistance  Bladder Hygiene: Stand-by assistance  Clothing Management: Stand-by assistance          Pain:  7/10 R LE    Activity Tolerance:   Good    After treatment patient left in no apparent distress:   Sitting in chair and Call bell within reach, bed locked and in lowest position    COMMUNICATION/COLLABORATION:   The patients plan of care was discussed with: Registered nurse. ROBYN Moralez  Time Calculation: 26 mins     Problem: Self Care Deficits Care Plan (Adult)  Goal: *Acute Goals and Plan of Care (Insert Text)  Description: FUNCTIONAL STATUS PRIOR TO ADMISSION: Patient was independent and active without use of DME. Patient was independent for basic and instrumental ADLs.      HOME SUPPORT: The patient lived with parents but did not require assist.    Occupational Therapy Goals  Initiated 10/12/2022    Pt stated goal \"to go home\"  Pt will be IND sup <> sit in prep for EOB ADLs  Pt will be MI grooming standing sink side LRAD  Pt will be MI UB dressing sitting EOB/long sit   Pt will be MI LE dressing sitting EOB/long sit  Pt will be MI sit <>  prep for toileting LRAD  Pt will be MI toileting/toilet transfer/cloth mgmt LRAD  Pt will be IND following UE HEP in prep for self care tasks      Outcome: Progressing Towards Goal

## 2022-10-13 NOTE — PROGRESS NOTES
Progress Note  Date:10/13/2022       Room:[facility]  Patient Diamante Hollins     Date of Birth:[birth year]     Age:21 y.o. Subjective    Subjective   Review of Systems  Objective         Vitals Last 24 Hours:  TEMPERATURE:  Temp  Av.9 °F (36.6 °C)  Min: 97.5 °F (36.4 °C)  Max: 98.8 °F (37.1 °C)  RESPIRATIONS RANGE: Resp  Av.2  Min: 16  Max: 17  PULSE OXIMETRY RANGE: SpO2  Av %  Min: 96 %  Max: 100 %  PULSE RANGE: Pulse  Av.7  Min: 66  Max: 87  BLOOD PRESSURE RANGE: Systolic (04ATH), BJL:023 , Min:120 , CJB:794   ; Diastolic (53ORM), GOF:48, Min:64, Max:82    I/O (24Hr): No intake or output data in the 24 hours ending 10/13/22 1501  Objective:  Vital signs: (most recent): Blood pressure 127/73, pulse 81, temperature 98.8 °F (37.1 °C), resp. rate 16, height 5' 7.01\" (1.702 m), weight 77.1 kg (170 lb), SpO2 100 %, not currently breastfeeding. Labs/Imaging/Diagnostics    Labs:  CBC:  Recent Labs     10/12/22  0459   WBC 8.9   RBC 3.58*   HGB 8.9*   HCT 27.6*   MCV 77.1*   RDW 17.8*        CHEMISTRIES:No results for input(s): NA, K, CL, CO2, BUN, CA, PHOS, MG in the last 72 hours. No lab exists for component: CREATININE, GLUCOSEPT/INR:No results for input(s): INR, INREXT in the last 72 hours. No lab exists for component: PROTIME  APTT:No results for input(s): APTT in the last 72 hours. LIVER PROFILE:No results for input(s): AST, ALT in the last 72 hours. No lab exists for component: Michael Burrell ALKPHOS  Lab Results   Component Value Date/Time    ALT (SGPT) 29 10/10/2022 03:40 AM    AST (SGOT) 24 10/10/2022 03:40 AM    Alk. phosphatase 56 10/10/2022 03:40 AM    Bilirubin, total 0.1 (L) 10/10/2022 03:40 AM       Imaging Last 24 Hours:  No results found.   Assessment//Plan   Active Problems:    Open right ankle fracture (10/7/2022)      Assessment & Plan patient seen for follow-up she is in the orthopedic floor for 41 sitting in chair alert verbal polite cooperative clean, bright affect slept well eating well getting pain relief getting physical therapy she says she will go home she will be living with her mother and she is looking for a therapist she also felt the current medication helping family Zyprexa.   Follow-up with the therapist and the psychiatrist.  At this time there is no depression no mood swings no psychosis but also cognizant about there is a potential for postpartum depression and postpartum psychosis mood swings and stay alert for the says the baby's father is not much interested and she is prepared for that thank you    Electronically signed by Tor Maguire MD on 10/13/2022 at 3:01 PM

## 2022-10-13 NOTE — PROGRESS NOTES
PHYSICAL THERAPY TREATMENT  Patient: Jimmy Zabala (47 y.o. female)  Date: 10/13/2022  Diagnosis: Open right ankle fracture [S82.891B] <principal problem not specified>  Procedure(s) (LRB):  Irrigation And Debridement Of Right Ankle, Wound Closure (Right) 3 Days Post-Op  Precautions:    Chart, physical therapy assessment, plan of care and goals were reviewed. ASSESSMENT  Patient continues with skilled PT services and is progressing towards goals. Pt in bed finishing breakfast upon PT arrival, agreeable to session. (See below for objective details and assist levels). Patient cont to demonstrate mod I/SBA for all mobility and demos steady/safe ambulation using RW. Progressing well overall and reported no questions or concerns about previously learning stairs/HEP. Overall pt tolerated session well today. Will continue to benefit from skilled PT services, and will continue to progress as tolerated. Current Level of Function Impacting Discharge (mobility/balance): safety with NWB, ankle pain    Other factors to consider for discharge: safety and assist at home         PLAN :  Patient continues to benefit from skilled intervention to address the above impairments. Continue treatment per established plan of care to address goals. Recommend with staff: Amb to bathroom for toileting with gt belt and AD    Recommendation for discharge: (in order for the patient to meet his/her long term goals)  Home with [address] [place] [address]    This discharge recommendation:  Has been made in collaboration with the attending provider and/or case management    IF patient discharges home will need the following DME: rolling walker       SUBJECTIVE:   Patient stated It just feels like pressure.     OBJECTIVE DATA SUMMARY:   Critical Behavior:  Neurologic State: Alert  Orientation Level: Appropriate for age  Cognition: Appropriate decision making     Functional Mobility Training:  Bed Mobility:  Rolling: Modified independent  Supine to Sit: Modified independent  Sit to Supine: Modified independent  Scooting: Modified independent    Transfers:  Sit to Stand: Stand-by assistance  Stand to Sit: Supervision    Balance:  Sitting: Intact  Standing: Intact; With support  Standing - Static: Constant support;Good  Standing - Dynamic : Constant support;Good    Ambulation/Gait Training:  Distance (ft): 130 Feet (ft)  Assistive Device: Gait belt;Walker, rolling  Ambulation - Level of Assistance: Stand-by assistance  Right Side Weight Bearing: Non-weight bearing       Pain Ratin/10 R ankle    Activity Tolerance:   Good    After treatment patient left in no apparent distress:   Bed locked and returned to lowest position, Supine in bed and Call bell within reach      COMMUNICATION/COLLABORATION:   The patients plan of care was discussed with: Registered nurse. Guille Contreras, PT   Time Calculation: 14 mins         Problem: Mobility Impaired (Adult and Pediatric)  Goal: *Acute Goals and Plan of Care (Insert Text)  Description: FUNCTIONAL STATUS PRIOR TO ADMISSION: Patient was independent and active without use of DME.    HOME SUPPORT PRIOR TO ADMISSION: The patient lived alone with no local support. Physical Therapy Goals  Initiated 10/8/2022  Pt stated goal: to get better  Pt will be I with LE HEP in 7 days. Goal met  Pt will perform bed mobility with mod I in 7 days. Goal met  Pt will perform transfers with mod I in 7 days. Pt will amb 25-50 feet with LRAD safely with mod I in 7 days.        Outcome: Progressing Towards Goal

## 2022-10-13 NOTE — PROGRESS NOTES
Discharge instructions reviewed with patient and her mom. Patient stated she had no further questions. Follow up appointments were made and prescriptions were sent to patient's preferred pharmacy. Pt vital signs stable and she shows no signs of distress. Pt was wheeled down by staff and left in a private vehicle with her mom.

## 2022-10-13 NOTE — PROGRESS NOTES
IMPRESSION:   Acute hypoxic respiratory failure resolved  Displaced open fracture of the right distal tibia and fibula  EtOH and polysubstance abuse  Status post open fracture right ankle irrigation and debridement application of external fixator  Hypokalemia corrected  Anemia hemoglobin  8.9      RECOMMENDATIONS/PLAN:     Extubated on 10/7  now on room air  Potassium normal  Status post staged right ankle fracture open I&D and external fixation wound VAC application  Clear to discharge from pulmonary perspective. [x] High complexity decision making was performed  [x] See my orders for details  HPI  69-year-old lady 4 weeks post partum came to the ED via EMS after jumping off a pillar outside of a restaurant and sustaining a open tib/fib fracture. The mother states that the patient has always been slightly off but since delivering her child has become increasingly different. The mother states that she does not speak with the family, recently sold all of her possessions, and moved into and air [facility] although the mother states that she had the option to live with multiple family members. The mother states that she has a relative diagnosed with schizoaffective disorder and a another relative has bipolar schizophrenia. The patient was combative in the ED and leading up to the surgery at which point she was sedated and intubated. PMH:  has no past medical history on file. PSH:   has no past surgical history on file. FHX: family history includes Psychotic Disorder in her maternal uncle. SHX:  reports current alcohol use. She reports current drug use. Frequency: 7.00 times per week. Drug: Marijuana.     ALL: No Known Allergies     MEDS:   [x] Reviewed - As Below   [] Not reviewed    Current Facility-Administered Medications   Medication    acetaminophen (TYLENOL) tablet 1,000 mg    HYDROmorphone (DILAUDID) injection 1 mg    oxyCODONE IR (ROXICODONE) tablet 5 mg    oxyCODONE IR (ROXICODONE) tablet 10 mg influenza vaccine 2022-23 (6 mos+)(PF) (FLUARIX/FLULAVAL/FLUZONE QUAD) injection 0.5 mL    0.9% sodium chloride infusion 250 mL    ondansetron (ZOFRAN) injection 4 mg    naloxone (NARCAN) injection 0.4 mg    sodium chloride (NS) flush 5-40 mL    enoxaparin (LOVENOX) injection 30 mg    piperacillin-tazobactam (ZOSYN) 3.375 g in 0.9% sodium chloride (MBP/ADV) 100 mL MBP    OLANZapine (ZyPREXA) tablet 5 mg    OLANZapine (ZyPREXA) injection 10 mg    LORazepam (ATIVAN) injection 1 mg    polyethylene glycol (MIRALAX) packet 17 g    docusate sodium (COLACE) capsule 100 mg    senna (SENOKOT) tablet 8.6 mg      MAR reviewed and pertinent medications noted or modified as needed   Current Facility-Administered Medications   Medication    acetaminophen (TYLENOL) tablet 1,000 mg    HYDROmorphone (DILAUDID) injection 1 mg    oxyCODONE IR (ROXICODONE) tablet 5 mg    oxyCODONE IR (ROXICODONE) tablet 10 mg    influenza vaccine 2022-23 (6 mos+)(PF) (FLUARIX/FLULAVAL/FLUZONE QUAD) injection 0.5 mL    0.9% sodium chloride infusion 250 mL    ondansetron (ZOFRAN) injection 4 mg    naloxone (NARCAN) injection 0.4 mg    sodium chloride (NS) flush 5-40 mL    enoxaparin (LOVENOX) injection 30 mg    piperacillin-tazobactam (ZOSYN) 3.375 g in 0.9% sodium chloride (MBP/ADV) 100 mL MBP    OLANZapine (ZyPREXA) tablet 5 mg    OLANZapine (ZyPREXA) injection 10 mg    LORazepam (ATIVAN) injection 1 mg    polyethylene glycol (MIRALAX) packet 17 g    docusate sodium (COLACE) capsule 100 mg    senna (SENOKOT) tablet 8.6 mg      PMH:  has no past medical history on file. PSH:   has no past surgical history on file. FHX: family history includes Psychotic Disorder in her maternal uncle. SHX:  reports current alcohol use. She reports current drug use. Frequency: 7.00 times per week. Drug: Marijuana.      ROS:  Review of system done as mentioned in the HPI and physical exam    Hemodynamics:    CO:    CI:    CVP:    SVR:   PAP Systolic:    PAP Diastolic: PVR:    SV02:        Ventilator Settings:      Mode Rate TV Press PEEP FiO2 PIP Min. Vent   Assist control, Volume control    460 ml    5 cm H20 35 %  19 cm H2O  7.8 l/min        Vital Signs: Telemetry:    normal sinus rhythm Intake/Output:   Visit Vitals  /82 (BP 1 Location: Left upper arm, BP Patient Position: Semi fowlers)   Pulse 66   Temp 97.5 °F (36.4 °C)   Resp 16   Ht 5' 7.01\" (1.702 m)   Wt 77.1 kg (170 lb)   SpO2 98%   Breastfeeding No Comment: Mother states not breastfeeding at this time   BMI 26.62 kg/m²       Temp (24hrs), Av.9 °F (36.6 °C), Min:97.5 °F (36.4 °C), Max:98.9 °F (37.2 °C)        O2 Device: None (Room air) O2 Flow Rate (L/min): 2 l/min       Wt Readings from Last 4 Encounters:   10/07/22 77.1 kg (170 lb)        No intake or output data in the 24 hours ending 10/13/22 0959      Last shift:      No intake/output data recorded. Last 3 shifts: 10/11 1901 - 10/13 0700  In: 240 [P.O.:240]  Out: -        Physical Exam:     General: Alert awake on room air  HEENT: NCAT, poor dentition, lips and mucosa dry  Eyes: anicteric; conjunctiva clear  Neck: no nodes, trach midline; no accessory MM use.   Chest: no deformity,   Cardiac: IR regular; no murmur;   Lungs: distant breath sounds; no wheezes  Abd: soft, NT, hypoactive BS  Ext: Right lower extremity cast with external fixator rods in place  : NO george, clear urine  Neuro: sedated with fentanyl and propofol  Psych- unable to assess  Skin: warm, dry, no cyanosis;   Pulses: 1-2+ Bilateral pedal, radial  Capillary: brisk; pale      DATA:    MAR reviewed and pertinent medications noted or modified as needed  MEDS:   Current Facility-Administered Medications   Medication    acetaminophen (TYLENOL) tablet 1,000 mg    HYDROmorphone (DILAUDID) injection 1 mg    oxyCODONE IR (ROXICODONE) tablet 5 mg    oxyCODONE IR (ROXICODONE) tablet 10 mg    influenza vaccine  (6 mos+)(PF) (FLUARIX/FLULAVAL/FLUZONE QUAD) injection 0.5 mL    0.9% sodium chloride infusion 250 mL    ondansetron (ZOFRAN) injection 4 mg    naloxone (NARCAN) injection 0.4 mg    sodium chloride (NS) flush 5-40 mL    enoxaparin (LOVENOX) injection 30 mg    piperacillin-tazobactam (ZOSYN) 3.375 g in 0.9% sodium chloride (MBP/ADV) 100 mL MBP    OLANZapine (ZyPREXA) tablet 5 mg    OLANZapine (ZyPREXA) injection 10 mg    LORazepam (ATIVAN) injection 1 mg    polyethylene glycol (MIRALAX) packet 17 g    docusate sodium (COLACE) capsule 100 mg    senna (SENOKOT) tablet 8.6 mg        Labs:    Recent Labs     10/12/22  0459   WBC 8.9   HGB 8.9*          No results for input(s): NA, K, CL, CO2, GLU, BUN, CREA, CA, MG, PHOS, LAC, ALB, TBIL, ALT, AML, LPSE in the last 72 hours. No lab exists for component: SGOT    No results for input(s): PH, PCO2, PO2, HCO3, FIO2 in the last 72 hours. No results for input(s): CPK, CKNDX, TROIQ in the last 72 hours. No lab exists for component: CPKMB  No results found for: BNPP, BNP   Lab Results   Component Value Date/Time    Culture result: No Growth (<1000 cfu/mL) 10/07/2022 09:20 AM     No results found for: TSH, TSHEXT, TSHEXT     Imaging:    Results from Hospital Encounter encounter on 10/07/22    XR CHEST PORT    Narrative  Clinical history: chf  INDICATION:   chf  COMPARISON: 10/7/2022    FINDINGS:  AP portable upright view of the chest demonstrates a stable  cardiopericardial  silhouette. There is no pleural effusion. .There is no focal consolidation. .There  is no pneumothorax. . Patient is on a cardiac monitor. Removal of ET tube. Impression  No acute process identified.       10/8 extubated yesterday now she is on room air electrolytes normal possible back to the OR soon for second procedure for distal tibia-fibula fracture which is stabilized CT of the leg stable  10/9 alert awake on room air getting pain medication possible wound closure today  10/10 awaiting for second part of the surgery  10/11 status post right ankle wound closure I&D was done yesterday, discontinue Zosyn with discharge  10/12 Patient laying in bed comfortably. S/p wound closure with I&D. Patient improved, clear to discharge from pulmonary perspective. 10/13 Patient comfortable. Denies chest pain, SOB, or cough. Lungs clear in all lobes. Awaiting CM to ensure arrangements are made for discharge home with home health.

## 2023-05-17 RX ORDER — ACETAMINOPHEN 500 MG
1000 TABLET ORAL EVERY 8 HOURS PRN
COMMUNITY
Start: 2022-10-13

## 2023-05-17 RX ORDER — AMOXICILLIN 250 MG
1 CAPSULE ORAL 2 TIMES DAILY PRN
COMMUNITY
Start: 2022-10-13

## 2024-05-06 ENCOUNTER — OFFICE VISIT (OUTPATIENT)
Age: 24
End: 2024-05-06
Payer: COMMERCIAL

## 2024-05-06 VITALS
RESPIRATION RATE: 16 BRPM | BODY MASS INDEX: 27.48 KG/M2 | DIASTOLIC BLOOD PRESSURE: 81 MMHG | WEIGHT: 175.06 LBS | SYSTOLIC BLOOD PRESSURE: 126 MMHG | OXYGEN SATURATION: 99 % | TEMPERATURE: 97.8 F | HEIGHT: 67 IN | HEART RATE: 72 BPM

## 2024-05-06 DIAGNOSIS — Z11.3 SCREENING FOR STD (SEXUALLY TRANSMITTED DISEASE): ICD-10-CM

## 2024-05-06 DIAGNOSIS — Z12.4 SCREENING FOR MALIGNANT NEOPLASM OF CERVIX: ICD-10-CM

## 2024-05-06 DIAGNOSIS — N94.89 SUPPRESSION OF MENSES: ICD-10-CM

## 2024-05-06 DIAGNOSIS — Z01.419 ENCOUNTER FOR ANNUAL ROUTINE GYNECOLOGICAL EXAMINATION: Primary | ICD-10-CM

## 2024-05-06 PROCEDURE — 99385 PREV VISIT NEW AGE 18-39: CPT | Performed by: OBSTETRICS & GYNECOLOGY

## 2024-05-06 RX ORDER — MEDROXYPROGESTERONE ACETATE 150 MG/ML
150 INJECTION, SUSPENSION INTRAMUSCULAR
Qty: 1 ML | Refills: 3 | Status: SHIPPED | OUTPATIENT
Start: 2024-05-06

## 2024-05-06 NOTE — PROGRESS NOTES
Exam   Constitutional  .  Appearance: Well-nourished, well-developed, alert, in no acute distress    HEENT  .  Head and face: Appears normal    Neck  .  Inspection/palpation: Normal appearance, no masses or tenderness  .  Lymph nodes: No lymphadenopathy present  .  Thyroid: Gland size normal, nontender, no nodules or masses present on palpation    Breasts  .  Symmetric, no palpable masses, no tenderness, no skin changes, no nipple abnormality, no nipple discharge, no lymphadenopathy    Chest  .  Respiratory effort: Even and unlabored  .  Auscultation: Normal breath sounds    Cardiovascular  .  Heart:   .  Auscultation: Regular rate and rhythm without murmur    Gastrointestinal  .  Abdominal examination: Abdomen nontender to palpation, normal bowel sounds, no masses present  .  Liver and spleen: No hepatomegaly present, spleen not palpable  .  Hernias: No hernias identified    Genitourinary  .  External genitalia: Normal appearance for age, no discharge present, no tenderness present, no inflammatory lesions present, no masses present, no atrophy present  .  Vagina: Normal vagina without central or paravaginal defects, no discharge present, no inflammatory lesions present, no masses present  .  Bladder: Nontender to palpation  .  Urethra: Appears normal  .  Cervix: Normal  .  Uterus: Normal size, shape and consistency  .  Adnexa: No adnexal tenderness present, no adnexal masses palpated  .  Perineum: Perineum with in normal limits, no evidence of trauma, no rashes or skin lesions present  .  Anus: Within normal limits, no hemorrhoids present  .  Inguinal lymph nodes: No lymphadenopathy present    Skin  .  General inspection: No rash, no lesions identified    Neurologic/psychiatric  .  Mental status:   .  Orientation: Grossly oriented to person, place and time   .  Mood and affect: Normal mood, affect appropriate    Chaperone was present for intimate exam.    No results found for this visit on 05/06/24.    Orders

## 2024-05-07 LAB
HIV 1+2 AB+HIV1 P24 AG SERPL QL IA: NON REACTIVE
TREPONEMA PALLIDUM IGG+IGM AB [PRESENCE] IN SERUM OR PLASMA BY IMMUNOASSAY: NON REACTIVE

## 2024-05-08 LAB
HBV SURFACE AG SERPL QL IA: NEGATIVE
HCV IGG SERPL QL IA: NON REACTIVE

## 2024-05-14 LAB
., LABCORP: ABNORMAL
C TRACH RRNA CVX QL NAA+PROBE: POSITIVE
CYTOLOGIST CVX/VAG CYTO: ABNORMAL
CYTOLOGY CVX/VAG DOC CYTO: ABNORMAL
CYTOLOGY CVX/VAG DOC THIN PREP: ABNORMAL
DX ICD CODE: ABNORMAL
DX ICD CODE: ABNORMAL
Lab: ABNORMAL
N GONORRHOEA RRNA CVX QL NAA+PROBE: NEGATIVE
OTHER STN SPEC: ABNORMAL
PATHOLOGIST CVX/VAG CYTO: ABNORMAL
RECOM F/U CVX/VAG CYTO: ABNORMAL
STAT OF ADQ CVX/VAG CYTO-IMP: ABNORMAL
T VAGINALIS RRNA SPEC QL NAA+PROBE: NEGATIVE

## 2024-05-16 ENCOUNTER — TELEPHONE (OUTPATIENT)
Age: 24
End: 2024-05-16

## 2024-05-16 DIAGNOSIS — A74.9 CHLAMYDIA: Primary | ICD-10-CM

## 2024-05-16 RX ORDER — DOXYCYCLINE HYCLATE 100 MG
100 TABLET ORAL 2 TIMES DAILY
Qty: 14 TABLET | Refills: 0 | Status: SHIPPED | OUTPATIENT
Start: 2024-05-16 | End: 2024-05-23

## 2024-05-16 NOTE — TELEPHONE ENCOUNTER
Attempted to contact patient left message for patient to call back to the office to discuss test results. Prescription sent per MD order.

## 2024-05-16 NOTE — TELEPHONE ENCOUNTER
----- Message from Shalom Foster MD sent at 5/15/2024  8:07 AM EDT -----  Pap smear with low-grade squamous intraepithelial lesion.  Needs colposcopy.  Please schedule an appointment accordingly.  Positive for Chlamydia trachomatis.  Please send prescription for doxycycline 100 mg 1 tab p.o. twice daily for 7 days and advise patient to complete.  Please also orient patient about the importance of partner treatment.  Retesting in about 3 months may be in order, please schedule an appointment.  Thank you

## 2024-05-31 ENCOUNTER — TELEPHONE (OUTPATIENT)
Age: 24
End: 2024-05-31

## 2024-05-31 DIAGNOSIS — A74.9 CHLAMYDIA: Primary | ICD-10-CM

## 2024-05-31 RX ORDER — DOXYCYCLINE HYCLATE 100 MG
100 TABLET ORAL 2 TIMES DAILY
Qty: 14 TABLET | Refills: 0 | Status: SHIPPED | OUTPATIENT
Start: 2024-05-31 | End: 2024-06-07

## 2024-06-11 ENCOUNTER — PROCEDURE VISIT (OUTPATIENT)
Age: 24
End: 2024-06-11
Payer: COMMERCIAL

## 2024-06-11 VITALS
HEIGHT: 67 IN | RESPIRATION RATE: 16 BRPM | DIASTOLIC BLOOD PRESSURE: 74 MMHG | SYSTOLIC BLOOD PRESSURE: 115 MMHG | TEMPERATURE: 98.3 F | BODY MASS INDEX: 28.18 KG/M2 | HEART RATE: 61 BPM | WEIGHT: 179.56 LBS

## 2024-06-11 DIAGNOSIS — N87.0 PAP SMEAR WITH MILD CERVICAL DYSPLASIA: Primary | ICD-10-CM

## 2024-06-11 PROBLEM — A74.9 CHLAMYDIA INFECTION: Status: ACTIVE | Noted: 2024-06-11

## 2024-06-11 PROCEDURE — 57454 BX/CURETT OF CERVIX W/SCOPE: CPT | Performed by: OBSTETRICS & GYNECOLOGY

## 2024-06-11 NOTE — PROGRESS NOTES
Subjective:  Chief complaint:  1.  Recent abnormal Pap smear  2.  Here today for colposcopy    HPI:    Arnav is a 23 y.o.female who came today for colposcopy after finding of abnormal Pap smear.    History reviewed. No pertinent past medical history.     History reviewed. No pertinent surgical history.     # 1 - Date: None, Sex: None, Weight: None, GA: None, Delivery: Vaginal, Spontaneous, Apgar1: None, Apgar5: None, Living: None, Birth Comments: None    # 2 - Date: None, Sex: None, Weight: None, GA: None, Delivery: Vaginal, Spontaneous, Apgar1: None, Apgar5: None, Living: None, Birth Comments: None    # 3 - Date: None, Sex: None, Weight: None, GA: None, Delivery: None, Apgar1: None, Apgar5: None, Living: None, Birth Comments: None         Current Outpatient Medications:     medroxyPROGESTERone (DEPO-PROVERA) 150 MG/ML injection, Inject 1 mL into the muscle every 3 months, Disp: 1 mL, Rfl: 3    acetaminophen (TYLENOL) 500 MG tablet, Take 2 tablets by mouth every 8 hours as needed (Patient not taking: Reported on 5/6/2024), Disp: , Rfl:     senna-docusate (PERICOLACE) 8.6-50 MG per tablet, Take 1 tablet by mouth 2 times daily as needed (Patient not taking: Reported on 5/6/2024), Disp: , Rfl:      REVIEW OF SYSTEMS:  Constitutional: Negative  HEENT: Negative  Eyes: Negative  Respiratory: Negative  Cardiovascular: Negative  Gastrointestinal: Negative  Genitourinary: Negative  Musculoskeletal: Negative  Skin: Negative  Neurological: Negative  Endo/heme: Negative  Psychiatric/behavioral: Negative     Objective:  Physical examination:  Constitutional  .  Appearance: Well-nourished, well-developed, alert, in no acute distress    Chest  .  Respiratory effort: Even and unlabored  .  Auscultation: Normal breath sounds    Cardiovascular  .  Heart:   .  Auscultation: Regular rate and rhythm without murmur    Genitourinary  .  External genitalia: Normal appearance for age, no discharge present, no tenderness present, no

## 2024-06-13 LAB
CPT BILLING CODE: NORMAL
CPT BILLING CODE: NORMAL
DIAGNOSIS SYNOPSIS:: NORMAL
DIAGNOSIS SYNOPSIS:: NORMAL
DX ICD CODE: NORMAL
DX ICD CODE: NORMAL
Lab: NORMAL
Lab: NORMAL
PATH REPORT.FINAL DX SPEC: NORMAL
PATH REPORT.FINAL DX SPEC: NORMAL
PATH REPORT.GROSS SPEC: NORMAL
PATH REPORT.GROSS SPEC: NORMAL
PATH REPORT.RELEVANT HX SPEC: NORMAL
PATH REPORT.RELEVANT HX SPEC: NORMAL
PATH REPORT.SITE OF ORIGIN SPEC: NORMAL
PATH REPORT.SITE OF ORIGIN SPEC: NORMAL
PATHOLOGIST NAME: NORMAL
PATHOLOGIST NAME: NORMAL
PAYMENT PROCEDURE: NORMAL
PAYMENT PROCEDURE: NORMAL

## 2024-06-13 NOTE — RESULT ENCOUNTER NOTE
Negative colposcopic directed biopsies.  A follow-up Pap smear is recommended in 1 year.  Please notify patient.  Thank you

## (undated) DEVICE — DRAPE,REIN 53X77,STERILE: Brand: MEDLINE

## (undated) DEVICE — SOLUTION IRRIG 500ML 0.9% SOD CHL USP POUR PLAS BTL

## (undated) DEVICE — TOWEL SURG W17XL27IN STD BLU COT NONFENESTRATED PREWASHED

## (undated) DEVICE — INTENDED FOR TISSUE SEPARATION, AND OTHER PROCEDURES THAT REQUIRE A SHARP SURGICAL BLADE TO PUNCTURE OR CUT.: Brand: BARD-PARKER ® STAINLESS STEEL BLADES

## (undated) DEVICE — HANDPIECE SET WITH HIGH FLOW TIP AND SUCTION TUBE: Brand: INTERPULSE

## (undated) DEVICE — CLAMP EXT FIX L TI ALLY COMB CLP ON SELF HLD

## (undated) DEVICE — SPONGE GZ W4XL4IN COT 12 PLY TYP VII WVN C FLD DSGN

## (undated) DEVICE — STERILE POLYISOPRENE POWDER-FREE SURGICAL GLOVES: Brand: PROTEXIS

## (undated) DEVICE — CANISTER WND VAC ASST CLSR --

## (undated) DEVICE — SUT VCRL + 2-0 27IN CT2 UD -- 36/BX

## (undated) DEVICE — SUT MONOCRYL PLUS UD 4-0 --

## (undated) DEVICE — GAUZE,SPONGE,FLUFF,6"X6.75",STRL,5/TRAY: Brand: MEDLINE

## (undated) DEVICE — POST EXT FIX DIA11MM 30DEG OUTRIG MAG RESONANCE CONDITIONAL

## (undated) DEVICE — COVER LT HNDL BLU PLAS

## (undated) DEVICE — ZIMMER® STERILE DISPOSABLE TOURNIQUET CUFF WITH PLC, DUAL PORT, SINGLE BLADDER, 34 IN. (86 CM)

## (undated) DEVICE — SOLUTION IRRIG 1000ML 0.9% SOD CHL USP POUR PLAS BTL

## (undated) DEVICE — GOWN,PREVENTION PLUS,XLN/XL,ST,24/CS: Brand: MEDLINE

## (undated) DEVICE — PADDING CAST W4INXL4YD SPUN DACRON POLY POR SYN VERSATILE

## (undated) DEVICE — BNDG ELAS HK LOOP 6X5YD NS -- MATRIX

## (undated) DEVICE — SOUTHSIDE TURNOVER: Brand: MEDLINE INDUSTRIES, INC.

## (undated) DEVICE — CLAMP EXT FIX L PIN 6 POS MAG RESONANCE CONDITIONAL

## (undated) DEVICE — BASIC SINGLE BASIN-LF: Brand: MEDLINE INDUSTRIES, INC.

## (undated) DEVICE — MINOR EXTREMITY PACK: Brand: MEDLINE INDUSTRIES, INC.

## (undated) DEVICE — GARMENT,MEDLINE,DVT,INT,CALF,MED, GEN2: Brand: MEDLINE

## (undated) DEVICE — DRAPE,HAND,STERILE: Brand: MEDLINE

## (undated) DEVICE — Device

## (undated) DEVICE — REM POLYHESIVE ADULT PATIENT RETURN ELECTRODE: Brand: VALLEYLAB

## (undated) DEVICE — SCREW FIX L200MM DIA5MM THRD L80MM S STL SELF DRL SCHNZ

## (undated) DEVICE — BNDG ELAS HK LOOP 4X5YD NS -- MATRIX

## (undated) DEVICE — GLOVE SURG SZ 8 CRM LTX FREE POLYISOPRENE POLYMER BEAD ANTI

## (undated) DEVICE — INTENDED FOR TISSUE SEPARATION, AND OTHER PROCEDURES THAT REQUIRE A SHARP SURGICAL BLADE TO PUNCTURE OR CUT.: Brand: BARD-PARKER ® CARBON RIB-BACK BLADES

## (undated) DEVICE — PIN FIX L225MM DIA6MM S STL FOR L EXT FIX SET

## (undated) DEVICE — T-DRAPE,EXTREMITY,STERILE: Brand: MEDLINE

## (undated) DEVICE — C-ARMOR C-ARM EQUIPMENT COVERS CLEAR STERILE UNIVERSAL FIT 12 PER CASE: Brand: C-ARMOR

## (undated) DEVICE — GLOVE SURG SZ 8 L12IN FNGR THK79MIL GRN LTX FREE

## (undated) DEVICE — PREP PAD BNS: Brand: CONVERTORS

## (undated) DEVICE — SOLUTION SURG PREP 26 CC PURPREP

## (undated) DEVICE — TOWEL,OR,DSP,ST,BLUE,DLX,1/PK,80PK/CS: Brand: MEDLINE

## (undated) DEVICE — DRESSING NEG PRSS SM 10X7.5X3.3CM POLYUR FOR WND THER VAC

## (undated) DEVICE — GLOVE SURG SZ 85 L12IN FNGR THK79MIL GRN LTX FREE

## (undated) DEVICE — 3-0 COATED VICRYL PLUS UNDYED 1X27" SH --

## (undated) DEVICE — ROCKER SWITCH PENCIL BLADE ELECTRODE, HOLSTER: Brand: EDGE

## (undated) DEVICE — PADDING,UNDERCAST,COTTON, 4"X4YD STERILE: Brand: MEDLINE